# Patient Record
Sex: FEMALE | Race: WHITE | URBAN - METROPOLITAN AREA
[De-identification: names, ages, dates, MRNs, and addresses within clinical notes are randomized per-mention and may not be internally consistent; named-entity substitution may affect disease eponyms.]

---

## 2018-07-25 PROCEDURE — 88313 SPECIAL STAINS GROUP 2: CPT | Performed by: PATHOLOGY

## 2018-07-25 PROCEDURE — 88305 TISSUE EXAM BY PATHOLOGIST: CPT | Performed by: PATHOLOGY

## 2018-07-26 ENCOUNTER — LAB REQUISITION (OUTPATIENT)
Dept: LAB | Facility: HOSPITAL | Age: 78
End: 2018-07-26
Payer: MEDICARE

## 2018-07-26 DIAGNOSIS — D49.2 NEOPLASM OF UNSPECIFIED BEHAVIOR OF BONE, SOFT TISSUE, AND SKIN: ICD-10-CM

## 2018-08-03 PROCEDURE — 88305 TISSUE EXAM BY PATHOLOGIST: CPT | Performed by: PATHOLOGY

## 2018-08-06 ENCOUNTER — LAB REQUISITION (OUTPATIENT)
Dept: LAB | Facility: HOSPITAL | Age: 78
End: 2018-08-06
Payer: MEDICARE

## 2018-08-06 DIAGNOSIS — D49.2 NEOPLASM OF UNSPECIFIED BEHAVIOR OF BONE, SOFT TISSUE, AND SKIN: ICD-10-CM

## 2019-02-19 PROCEDURE — 88304 TISSUE EXAM BY PATHOLOGIST: CPT | Performed by: PATHOLOGY

## 2019-02-20 ENCOUNTER — LAB REQUISITION (OUTPATIENT)
Dept: LAB | Facility: HOSPITAL | Age: 79
End: 2019-02-20
Payer: MEDICARE

## 2019-02-20 DIAGNOSIS — D49.2 NEOPLASM OF UNSPECIFIED BEHAVIOR OF BONE, SOFT TISSUE, AND SKIN: ICD-10-CM

## 2019-08-16 PROCEDURE — 88305 TISSUE EXAM BY PATHOLOGIST: CPT | Performed by: PATHOLOGY

## 2019-08-17 ENCOUNTER — LAB REQUISITION (OUTPATIENT)
Dept: LAB | Facility: HOSPITAL | Age: 79
End: 2019-08-17
Payer: MEDICARE

## 2019-08-17 DIAGNOSIS — D49.2 NEOPLASM OF UNSPECIFIED BEHAVIOR OF BONE, SOFT TISSUE, AND SKIN: ICD-10-CM

## 2019-12-09 ENCOUNTER — EVALUATION (OUTPATIENT)
Dept: PHYSICAL THERAPY | Facility: REHABILITATION | Age: 79
End: 2019-12-09
Payer: MEDICARE

## 2019-12-09 ENCOUNTER — TRANSCRIBE ORDERS (OUTPATIENT)
Dept: PHYSICAL THERAPY | Facility: REHABILITATION | Age: 79
End: 2019-12-09

## 2019-12-09 DIAGNOSIS — I63.9 THALAMIC STROKE (HCC): Primary | ICD-10-CM

## 2019-12-09 DIAGNOSIS — I63.9 IMPENDING CEREBROVASCULAR ACCIDENT (HCC): Primary | ICD-10-CM

## 2019-12-09 PROCEDURE — 97162 PT EVAL MOD COMPLEX 30 MIN: CPT

## 2019-12-09 NOTE — PROGRESS NOTES
2019  Ladonna Duncan  : 1940  MRN: [id number]  Home:[phone] (home)   Mobile: There is no such number on file (mobile)  Insurance Information: Payor: MEDICARE / Plan: MEDICARE A AND B / Product Type: Medicare A & B Fee for Service /   Referring Provider: No ref  provider found    SUBJECTIVE:    HPI: Angel Montano is a [de-identified] y o  female referred to outpatient physical therapy for No diagnosis found  Physician Report: I had the pleasure of seeing Terrace Feeling at [facility] Cardiology office today for follow up of hospitalization for stroke    This is a pleasant [de-identified] y o  female with a history of venous insufficiency, hypertension, HLD, VARGHESE    Admitted 19 - 19 with right sided paresthesia  MRI + thalamic stroke  Loop recorder placed  Today, accompanied with   Has many questions regarding diet  PCP has already place referral to nutrition  Patient reports: Angel Montano reports she was recently diagnosed with a stroke 2019 staying in the hospital until 2019  Since her discharged she has noticed improvement in her right arm and leg but still notices abnormal sensation on her right side  Her biggest concern is the strength of her right hand and her handwriting since she is right hand  She denies any recent falls since her stroke but at this time she has noticed a decrease in her balance  Falls Hx: Her last fall was in  resulting in a fracture of her right hip  Home Environment: Two story home with a basement  She has 6 steps to enter her home with a handrail on her right side  When she is going up to the second floor she has 13 steps with a handrail on the right  She does have equipment in the bathroom such as grab bars for the bath, shower chair, and commode       DME: Michelle Shelton and [employer]     Patient Goal: " To improve my balance "     OBJECTIVE:   Blood Pressure Automatic Cuff: 156/79mmHg HR: 67bpm    Resting Nystagmus: Negative  Pronator Drift: Negative  Finger to Nose: Slight impairment on the right  Heel to [facility]: Normal  Rapid Alternating Movements: Slight impairment on the right     Manual Muscle Testing - Hip Right Left   Flexion 4 5   Abduction 4 5   Adduction 4 5     Manual Muscle Testing - Knee Right Left   Flexion 4 5   Extension 4 5     Manual Muscle Testing - Ankle Right Left   Doriflexion 4 5   Plantarflexion 4 5     Gait Mechanics: Corinne ambulates with a cane with a step through gait pattern  However, she demonstrates decrease stance time on the right versus the left and no heel strike on the right landing more on the midfoot to forefoot  She also demonstrates decrease arm swing on the right versus the left with limited trunk rotation  Outcome Measures 12/9/2019    2 Minute Walk Test (ft): 350 feet    10MWT (s):  13 49 seconds    5x Sit To Stand (s):  20 57 seconds    TUG (s) NT        ASSESSMENT  Patient presents to outpatient physical therapy with sx consistent with thalamic stroke affect the right-side of her body  Patient's impairments include decrease functional mobility, decrease functional endurance, decrease functional strength, impaired gait mechanics, and decrase balance  Gait mechanics and mobility measures reported in objective setting; indicate the patient is at an increase risk of experiencing a fall  The DGI or the FGA was not completed during this session because of time constraint of the session where the patient required frequent rest breaks  Jessi Lee will tolerate completing the FGA versus the DGI  Rishi Mares reported issues with her handwriting and will be recommending a script for occupational therapy to address these issues  Rishi Mares would benefit from outpatient physical therapy and with her having a good prognosis based on her past medical history and her current status  These impairments are currently limiting Corinne's ability to participate in recreational activities, community activities, and ADLs   Rishi Mares will benefit from skilled outpatient physical therapy to address the above impairments in order to improve patient's quality of life  STG's (8 weeks):   - Patient will be independent with home exercise program in 2 weeks  - Patient will demonstrate a  1m/sec increase in gait speed time to demonstrate improved functional mobility and functional independence  - Patient will complete 5 time sit to  no more than 15 seconds to demonstrate improved functional mobility, functional strength, and functional independence  - Patient will complete 6 minute walk test to demonstrate improved functional mobility, functional independence, and functional endurance  LTG's (16 weeks):  - Patient will demonstrate a  1m/sec increase in gait speed time to demonstrate improved functional mobility and functional independence  - Patient will complete 5 time sit to  no more than 12 seonds seconds to demonstrate improved functional mobility, functional strength, and functional independence  - Patient will demonstrate at least a 10% improvement on the 6 minute walk test to improved functional mobility, functional independence, and functional endurance  - Patient will score at least 22/30 on FGA to demonstrate improved dynamic balance, improved functional mobility, functional strength, and functioanl independence     PLAN OF CARE  Patient will benefit from skilled outpatient physical therapy 2 x/wk for 16 weeks  Therapeutic interventions to include therapeutic activity, therapeutic exercise, and neuromuscular re-education as listed below in the grids        Precautions: fall risk and thalamic stroke monitor blood pressure     Daily Treatment Diary      Exercise Diary         Endurance          Lower Extremity Strengthening         Static Balance         Dynamic Balance

## 2019-12-09 NOTE — LETTER
2019    Cameron Allison, [address] [facility] [address]    Patient: Rayne Rodriguez   YOB: 1940   Date of Visit: 2019     Encounter Diagnosis     ICD-10-CM    1  Thalamic stroke [facility]) [id number]        Dear Dr Filippo Price: Thank you for your recent referral of Rayne Rodriguez  Please review the attached evaluation summary from Ivory's recent visit  Please verify that you agree with the plan of care by signing the attached order  If you have any questions or concerns, please do not hesitate to call  I sincerely appreciate the opportunity to share in the care of one of your patients and hope to have another opportunity to work with you in the near future  Sincerely,    Min Hensley, PT      Referring Provider:      I certify that I have read the below Plan of Care and certify the need for these services furnished under this plan of treatment while under my care  Cameron Allison, [facility] [address]: [phone]          2019  Rayne Rodriguez  : 1940  MRN: [id number]  Home:[phone] (home)   Mobile: There is no such number on file (mobile)  Insurance Information: Payor: MEDICARE / Plan: MEDICARE A AND B / Product Type: Medicare A & B Fee for Service /   Referring Provider: No ref  provider found    SUBJECTIVE:    HPI: Narendra Thomas is a [de-identified] y o  female referred to outpatient physical therapy for No diagnosis found  Physician Report: I had the pleasure of seeing Colleen Coleman at [address] [facility] Cardiology office today for follow up of hospitalization for stroke    This is a pleasant [de-identified] y o  female with a history of venous insufficiency, hypertension, HLD, VARGHESE    Admitted 19 - 19 with right sided paresthesia  MRI + thalamic stroke  Loop recorder placed  Today, accompanied with   Has many questions regarding diet  PCP has already place referral to nutrition       Patient reports: Narendra Thomas reports she was recently diagnosed with a stroke 11/11//2019 staying in the hospital until 11/14/2019  Since her discharged she has noticed improvement in her right arm and leg but still notices abnormal sensation on her right side  Her biggest concern is the strength of her right hand and her handwriting since she is right hand  She denies any recent falls since her stroke but at this time she has noticed a decrease in her balance  Falls Hx: Her last fall was in 2015 resulting in a fracture of her right hip  Home Environment: Two story home with a basement  She has 6 steps to enter her home with a handrail on her right side  When she is going up to the second floor she has 13 steps with a handrail on the right  She does have equipment in the bathroom such as grab bars for the bath, shower chair, and commode  DME: Anastacio Parks and [employer]     Patient Goal: " To improve my balance "     OBJECTIVE:   Blood Pressure Automatic Cuff: 156/79mmHg HR: 67bpm    Resting Nystagmus: Negative  Pronator Drift: Negative  Finger to Nose: Slight impairment on the right  Heel to Shine: Normal  Rapid Alternating Movements: Slight impairment on the right     Manual Muscle Testing - Hip Right Left   Flexion 4 5   Abduction 4 5   Adduction 4 5     Manual Muscle Testing - Knee Right Left   Flexion 4 5   Extension 4 5     Manual Muscle Testing - Ankle Right Left   Doriflexion 4 5   Plantarflexion 4 5     Gait Mechanics: Ivory ambulates with a cane with a step through gait pattern  However, she demonstrates decrease stance time on the right versus the left and no heel strike on the right landing more on the midfoot to forefoot  She also demonstrates decrease arm swing on the right versus the left with limited trunk rotation       Outcome Measures 12/9/2019    2 Minute Walk Test (ft): 350 feet    10MWT (s):   13 49 seconds    5x Sit To Stand (s):   20 57 seconds    TUG (s) NT        ASSESSMENT  Patient presents to outpatient physical therapy with sx consistent with  thalamic stroke affect the right-side of her body  Patient's impairments include decrease functional mobility, decrease functional endurance, decrease functional strength, impaired gait mechanics, and decrase balance  Gait mechanics and mobility measures reported in objective setting; indicate the patient is at an increase risk of experiencing a fall  The DGI or the FGA was not completed during this session because of time constraint of the session where the patient required frequent rest breaks  Perla Rangel will tolerate completing the FGA versus the DGI  Charlie Lopes reported issues with her handwriting and will be recommending a script for occupational therapy to address these issues  Charlie Lopes would benefit from outpatient physical therapy and with her having a good prognosis based on her past medical history and her current status  These impairments are currently limiting Ivory's ability to participate in recreational activities, community activities, and ADLs  Charlie Lopes will benefit from skilled outpatient physical therapy to address the above impairments in order to improve patient's quality of life  STG's (8 weeks):   - Patient will be independent with home exercise program in 2 weeks  - Patient will demonstrate a  1m/sec increase in gait speed time to demonstrate improved functional mobility and functional independence  - Patient will complete 5 time sit to  no more than 15 seconds to demonstrate improved functional mobility, functional strength, and functional independence  - Patient will complete 6 minute walk test to demonstrate improved functional mobility, functional independence, and functional endurance  LTG's (16 weeks):  - Patient will demonstrate a  1m/sec increase in gait speed time to demonstrate improved functional mobility and functional independence    - Patient will complete 5 time sit to  no more than 12 seonds seconds to demonstrate improved functional mobility, functional strength, and functional independence  - Patient will demonstrate at least a 10% improvement on the 6 minute walk test to improved functional mobility, functional independence, and functional endurance  - Patient will score at least 22/30 on FGA to demonstrate improved dynamic balance, improved functional mobility, functional strength, and functioanl independence     PLAN OF CARE  Patient will benefit from skilled outpatient physical therapy  2 x/wk for  16 weeks  Therapeutic interventions to include therapeutic activity, therapeutic exercise, and neuromuscular re-education as listed below in the grids        Precautions: fall risk and thalamic stroke monitor blood pressure     Daily Treatment Diary      Exercise Diary         Endurance          Lower Extremity Strengthening         Static Balance         Dynamic Balance

## 2019-12-12 ENCOUNTER — EVALUATION (OUTPATIENT)
Dept: OCCUPATIONAL THERAPY | Facility: REHABILITATION | Age: 79
End: 2019-12-12
Payer: MEDICARE

## 2019-12-12 ENCOUNTER — OFFICE VISIT (OUTPATIENT)
Dept: PHYSICAL THERAPY | Facility: REHABILITATION | Age: 79
End: 2019-12-12
Payer: MEDICARE

## 2019-12-12 DIAGNOSIS — I63.9 THALAMIC STROKE (HCC): Primary | ICD-10-CM

## 2019-12-12 PROCEDURE — 97112 NEUROMUSCULAR REEDUCATION: CPT

## 2019-12-12 PROCEDURE — 97116 GAIT TRAINING THERAPY: CPT

## 2019-12-12 PROCEDURE — 97110 THERAPEUTIC EXERCISES: CPT

## 2019-12-12 PROCEDURE — 97166 OT EVAL MOD COMPLEX 45 MIN: CPT | Performed by: OCCUPATIONAL THERAPIST

## 2019-12-12 NOTE — PROGRESS NOTES
OCCUPATIONAL THERAPY INITIAL EVALUATION:    2019  [facility]  1940  [id number]  MEKA Harvey  No diagnosis found  Subjective Evaluation    Quality of life: good          "I lost a lot of strength in my R hand "    PATIENT GOAL: "I want to get back to where I was before my stroke "    HISTORY OF PRESENT ILLNESS:     Pt is a [de-identified] y o  female who was referred to Occupational Therapy s/p Thalamic Stroke  Pt presented to [facility] on 2019 with complaint of right sided paresthesia  The symptoms waxed and waned one day prior to ED evaluation  Reports missing several doses of her BP meds at home  MRI with evidence of thalamic stroke  She was followed by Neurology during her stay and received 3 days of aspirin and was started on clopidogrel  Atorvastatin was also started  A 2D echo with enlarged left atrium, and Cardiology consulted for placement of loop recorder  She underwent loop recorder insertion on discharge day and is feeling well  She is going to follow up with PT/OT as an outpatient  She will also follow up with Cardiology and Neurology as an outpatient  PT referred Pt for OT services 2* complaints of R side weakness and changes in handwriting  Pt reports continuing to notice R sided weakness, altered sensation throughout RUE, stiffness with IR AROM, and decreased fluidity/legibility of handwriting  Pt lives in a two story home independently  Pt currently performing all ADLs at independent status, though demo with Min A with IADLs  Pt is a retired  at multiple locations  Pt loss role of  at this time since CVA and also since no car longer owned  PMH: No past medical history on file  Pain Levels:     Restin    With Activity:  3    Objective     Functional Assessment        Comments  See impairment section for further details  Impairment Observations:  1  R hand weakness  2  Decreased fluidity/legibility of handwriting  3   Impaired R proprioception  4  Altered sensation throughout RUE        Dynamometer Position #2:   R: 46  L: 50    R hand dominant  Action:  3 point pinch: R 11 5 and L 11  2 point pinch: R 6 and L 7  Lateral pinch: R 11 and L 11    9-hole Peg Test: RUE: 18 94 seconds, LUE: 20 96 seconds    Darrius Hand function Test:  Complete next session 2* time constraints  Myofilaments: B/L 3 61, no difficulties with distinguishing between hot or cold temperatures    B/L AROM:  Shoulder elevation: B/L full  Shoulder FF: B/L full  Shoulder ABD: B/L full  ER/IR: B/L full  Elbow ext/flex: B/L full  Sup/Pron: B/L full  Wrist flex/ext: B/L full  Composite: B/L full  Hook: B/L full  Opposition: B/L intact  Finger to nose: impaired R proprioception, L intact  Dysdiadochokinesia: B/L intact      Assessment  Assessment details: See skilled analysis for further details  Skilled Analysis:  Pt is a [de-identified] y o  female referred to Occupational Therapy s/p Thalamic Stroke  Pt presents with R hand weakness, decreased fluidity/legibility of handwriting, impaired R proprioception, and altered sensation throughout RUE affecting functional use and engagement in meaningful occupations/IADLs  Pt will benefit from skilled Occupational Therapy services 2x/week for 6-8 weeks with focus on self-care management, neuro re-ed, manual tx, and sensory re-ed to improve on the above deficits, improve functional use of RUE, maximize highest level of independence, and enhance overall QOL       Short Term Goals:  - Pt will increase R prehension patterns for improved tripod with utensil management and dynamic tripod grasp with writing utensil with <5% droppage and 70% improved fluidity/legibility 4 weeks  - Pt will increase proprioception of RUE hand to target x 50% for improved functional reach vision occluded with ADL/IADL tasks 4 weeks  - Pt will demo with G carryover of Home Exercise Program to improve functional progression towards goals in Plan of care and for improved functional use of RUE 4 weeks  - Pt will increase automaticity of R UE to 75% for improved grasp release of tabletop items for improved functional performance with ADLs/IADLs/salient tasks 4 weeks  - Pt will increase rate of manipulation for all R FM tests x 70% for improved functional performance with salient tasks 4 weeks  - Pt will increase R UE to Mod I with <10% cuing for tabletop tasks for improved functional performance of life roles and salient tasks 4 weeks      Long Term Goals:  - Pt will increase automaticity of  R UE to 95% for resumption of B integrative tasks  - Pt will increase R prehension patterns for improved tripod with utensil management and dynamic tripod grasp with writing utensil with <0% droppage and 90% improved fluidity/legibility   - Pt will increase proprioception of RUE hand to target x 85% for improved functional reach vision occluded with ADL/IADL tasks   - Pt will increase automaticity of R UE to 90% for improved grasp release of tabletop items for improved functional performance with ADLs/IADLs/salient tasks  - Pt will increase rate of manipulation for all R FM tests x 90% for improved functional performance with salient tasks   - Pt will increase R UE to independent status with 0% cuing for tabletop tasks for improved functional performance of life roles and salient tasks     OTHER PLANNED THERAPY INTERVENTIONS:   Supine, seated, and in stance neuro re-ed  FMC/prehension  Timed Trials  Manual tx  Sensory re-ed  Seated functional reach: crossing midline  Supine place and hold  WBearing strategies    Handwriting  Hand to target    INTERVENTION COMMENTS:  Diagnosis: Thalamic Stroke   Precautions: HTN, Hypothyroidism, fall risk  FOTO: 40, with 40% limitation in Hand function   Insurance: Payor: MEDICARE / Plan: MEDICARE A AND B / Product Type: Medicare A & B Fee for Service /   1 of 10 visits, PN due 01/12/2020

## 2019-12-12 NOTE — LETTER
December 16, 2019    Damaris Calvillo, [address] [facility] [address]    Patient: Evelyn Watkins   YOB: 1940   Date of Visit: 12/12/2019     Encounter Diagnosis     ICD-10-CM    1  Thalamic stroke [facility]) I63 9        Dear Dr Radha Love: Thank you for your recent referral of Evelyn Watkins  Please review the attached evaluation summary from Ivory's recent visit  Please verify that you agree with the plan of care by signing the attached order  If you have any questions or concerns, please do not hesitate to call  I sincerely appreciate the opportunity to share in the care of one of your patients and hope to have another opportunity to work with you in the near future  Sincerely,    Ludwig Kimbrough, OT      Referring Provider:     I certify that I have read the below Plan of Care and certify the need for these services furnished under this plan of treatment while under my care  Damaris Rajinder, [address] [address]  VIA Facsimile: [phone]          OCCUPATIONAL THERAPY INITIAL EVALUATION:    12/12/2019  Evelyn Watkins  1940  [id number]  MEKA Huizar  No diagnosis found  Subjective Evaluation    Quality of life: good          "I lost a lot of strength in my R hand "    PATIENT GOAL: "I want to get back to where I was before my stroke "    HISTORY OF PRESENT ILLNESS:     Pt is a [de-identified] y o  female who was referred to Occupational Therapy s/p Thalamic Stroke  Pt presented to [facility] on 11/11/2019 with complaint of right sided paresthesia  The symptoms waxed and waned one day prior to ED evaluation  Reports missing several doses of her BP meds at home  MRI with evidence of thalamic stroke  She was followed by Neurology during her stay and received 3 days of aspirin and was started on clopidogrel  Atorvastatin was also started  A 2D echo with enlarged left atrium, and Cardiology consulted for placement of loop recorder   She underwent loop recorder insertion on discharge day and is feeling well  She is going to follow up with PT/OT as an outpatient  She will also follow up with Cardiology and Neurology as an outpatient  PT referred Pt for OT services 2* complaints of R side weakness and changes in handwriting  Pt reports continuing to notice R sided weakness, altered sensation throughout RUE, stiffness with IR AROM, and decreased fluidity/legibility of handwriting  Pt lives in a two story home independently  Pt currently performing all ADLs at independent status, though demo with Min A with IADLs  Pt is a retired  at multiple locations  Pt loss role of  at this time since CVA and also since no car longer owned  PMH: No past medical history on file  Pain Levels:     Restin    With Activity:  3    Objective     Functional Assessment        Comments  See impairment section for further details  Impairment Observations:  1  R hand weakness  2  Decreased fluidity/legibility of handwriting  3  Impaired R proprioception  4  Altered sensation throughout RUE        Dynamometer Position #2:   R: 46  L: 50    R hand dominant  Action:  3 point pinch: R 11 5 and L 11  2 point pinch: R 6 and L 7  Lateral pinch: R 11 and L 11    9-hole Peg Test: RUE: 18 94 seconds, LUE: 20 96 seconds    Darrius Hand function Test:  Complete next session 2* time constraints  Myofilaments: B/L 3 61, no difficulties with distinguishing between hot or cold temperatures    B/L AROM:  Shoulder elevation: B/L full  Shoulder FF: B/L full  Shoulder ABD: B/L full  ER/IR: B/L full  Elbow ext/flex: B/L full  Sup/Pron: B/L full  Wrist flex/ext: B/L full  Composite: B/L full  Hook: B/L full  Opposition: B/L intact  Finger to nose: impaired R proprioception, L intact  Dysdiadochokinesia: B/L intact      Assessment  Assessment details: See skilled analysis for further details  Skilled Analysis:  Pt is a [de-identified] y o  female referred to Occupational Therapy s/p Thalamic Stroke  Pt presents with R hand weakness, decreased fluidity/legibility of handwriting, impaired R proprioception, and altered sensation throughout RUE affecting functional use and engagement in meaningful occupations/IADLs  Pt will benefit from skilled Occupational Therapy services 2x/week for 6-8 weeks with focus on self-care management, neuro re-ed, manual tx, and sensory re-ed to improve on the above deficits, improve functional use of RUE, maximize highest level of independence, and enhance overall QOL       Short Term Goals:  - Pt will increase R prehension patterns for improved tripod with utensil management and dynamic tripod grasp with writing utensil with <5% droppage and 70% improved fluidity/legibility 4 weeks  - Pt will increase proprioception of RUE hand to target x 50% for improved functional reach vision occluded with ADL/IADL tasks 4 weeks  - Pt will demo with G carryover of Home Exercise Program to improve functional progression towards goals in Plan of care and for improved functional use of RUE 4 weeks  - Pt will increase automaticity of R UE to 75% for improved grasp release of tabletop items for improved functional performance with ADLs/IADLs/salient tasks 4 weeks  - Pt will increase rate of manipulation for all R FM tests x 70% for improved functional performance with salient tasks 4 weeks  - Pt will increase R UE to Mod I with <10% cuing for tabletop tasks for improved functional performance of life roles and salient tasks 4 weeks      Long Term Goals:  - Pt will increase automaticity of  R UE to 95% for resumption of B integrative tasks  - Pt will increase R prehension patterns for improved tripod with utensil management and dynamic tripod grasp with writing utensil with <0% droppage and 90% improved fluidity/legibility   - Pt will increase proprioception of RUE hand to target x 85% for improved functional reach vision occluded with ADL/IADL tasks   - Pt will increase automaticity of R UE to 90% for improved grasp release of tabletop items for improved functional performance with ADLs/IADLs/salient tasks  - Pt will increase rate of manipulation for all R FM tests x 90% for improved functional performance with salient tasks   - Pt will increase R UE to independent status with 0% cuing for tabletop tasks for improved functional performance of life roles and salient tasks     OTHER PLANNED THERAPY INTERVENTIONS:   Supine, seated, and in stance neuro re-ed  FMC/prehension  Timed Trials  Manual tx  Sensory re-ed  Seated functional reach: crossing midline  Supine place and hold  WBearing strategies    Handwriting  Hand to target    INTERVENTION COMMENTS:  Diagnosis: Thalamic Stroke   Precautions: HTN, Hypothyroidism, fall risk  FOTO: 40, with 40% limitation in Hand function   Insurance: Payor: MEDICARE / Plan: MEDICARE A AND B / Product Type: Medicare A & B Fee for Service /   1 of 10 visits, PN due 01/12/2020

## 2019-12-12 NOTE — PROGRESS NOTES
Daily Note     Today's date: 2019  Patient name: Nam Burroughs  : 1940  MRN: [id number]  Referring provider: MEKA Irizarry  Dx:   Encounter Diagnosis     ICD-10-CM    1  Thalamic stroke ([facility] [address] ) I63 9        Start Time:   Stop Time: 1545  Total time in clinic (min): 40 minutes    Subjective: Pt reports she feels good today but she expresses that she would like to get rid of her altered sensation on her rt side, denies recent hx of falls  Objective: See treatment diary below    Exercise Diary         Endurance  Scifit lv 1 x3 min, rest then  x2 min        Lower Extremity Strengthening  HEP x8 minutes  STS  Heel raise  1/4 squat  bridges       Static Balance         Dynamic Balance  Lateral stepping 20ft x4  Crossover stepping 20ft x4  Grapevines 20ft x4  Tandem walking 20ft x4    Ambulation w/out AD x 8 minutes    Gait training w/ cane x12 minutes                       Assessment: Tolerated treatment well  She requires rail support for grapevines, and tandem walking  Observed forward flexed posture w/ amulation in clinic, but upright posture w/ balance exercises  Pt reported during session she was not taught how to walk w/ cane, adjusted cane to pt height and reviewed gait training w/ AD in left hand, as she had been using it in her right  During session she reported fatigue w/ cane in her left hand, educated pt to practice at home w/ cane in left hand at short intervals, about 2-3, and progress to tolerance  Provided pt w/ LE strengthening HEP, pt demonstrated correct form in clinic  Significant fatigue using Scifit, pt required rest during and after its use  Patient would benefit from continued PT      Plan: Continue per plan of care  Continue gait training w/ AD as needed, progress balance exercises, trial use of recumbent bike in place of scifit for increased tolerance to endurance exercises

## 2019-12-16 ENCOUNTER — OFFICE VISIT (OUTPATIENT)
Dept: PHYSICAL THERAPY | Facility: REHABILITATION | Age: 79
End: 2019-12-16
Payer: MEDICARE

## 2019-12-16 ENCOUNTER — TRANSCRIBE ORDERS (OUTPATIENT)
Dept: PHYSICAL THERAPY | Facility: REHABILITATION | Age: 79
End: 2019-12-16

## 2019-12-16 DIAGNOSIS — I63.9 THALAMIC STROKE (HCC): ICD-10-CM

## 2019-12-16 DIAGNOSIS — I63.9 IMPENDING CEREBROVASCULAR ACCIDENT (HCC): Primary | ICD-10-CM

## 2019-12-16 DIAGNOSIS — I63.9 THALAMIC STROKE (HCC): Primary | ICD-10-CM

## 2019-12-16 PROCEDURE — 97112 NEUROMUSCULAR REEDUCATION: CPT

## 2019-12-16 PROCEDURE — 97110 THERAPEUTIC EXERCISES: CPT

## 2019-12-16 NOTE — PROGRESS NOTES
Daily Note     Today's date: 2019  Patient name: Roni Montilla  : 1940  MRN: [id number]  Referring provider: MEKA Paris  Dx:   Encounter Diagnosis     ICD-10-CM    1  Thalamic stroke ([facility] [address] ) I63 9        Start Time: 1130  Stop Time: 1215  Total time in clinic (min): 45 minutes    Subjective: Pt reports she completed HEP prior to coming to PT  Objective: See treatment diary below    Exercise Diary       Endurance  Scifit lv 1 x3 min, rest then  x2 min  Attempted recumbent bike    Scifit lv 1 x3 min, rest, x2      Lower Extremity Strengthening  HEP x8 minutes  STS  Heel raise  1/4 squat  bridges  STS x7, no UEs  Heel raise 10x2  1/4 squats 10x2  Bridges 10x2     Static Balance         Dynamic Balance  Lateral stepping 20ft x4  Crossover stepping 20ft x4  Grapevines 20ft x4  Tandem walking 20ft x4    Ambulation w/out AD x 8 minutes    Gait training w/ cane x12 minutes  Ambulation w/out AD 2 min x3 w/ 2 minute rest (LBP during activity 3/10)  Lateral stepping 20 ft x4  Backwards walking 20ft x4  Ambulation w/ sudden stops x2 minutes                     Assessment: Tolerated treatment well  Educated pt to avoid exercises shortly before session in order to manage fatigue  Observed fatigue w/ all strengthening exericses, pt requires seated rest break between most exercises  Pt reports discomfort throughout use of recumbent bike, despite repeated efforts to adjust it, deferred further use of recumbent bike because of this  Antalgic gait noted during ambulation, but no LOB observed  However her duration is limited by onset of LBP, seated rest if effective in reducing pt pain  With backwards walking pt has low guard and widened base of support, recommend anterior/posterior weight shifting to assist pt in increased comfort w/ backwards walking  Patient would benefit from continued PT      Plan: Continue per plan of care    Continue gait training w/out AD as she does not use one at home, incorporate biodex next visit for improved ankle and hip strategy use  Trial steps, monitor pt balance and fatigue on clinic steps

## 2019-12-19 ENCOUNTER — OFFICE VISIT (OUTPATIENT)
Dept: OCCUPATIONAL THERAPY | Facility: REHABILITATION | Age: 79
End: 2019-12-19
Payer: MEDICARE

## 2019-12-19 ENCOUNTER — OFFICE VISIT (OUTPATIENT)
Dept: PHYSICAL THERAPY | Facility: REHABILITATION | Age: 79
End: 2019-12-19
Payer: MEDICARE

## 2019-12-19 DIAGNOSIS — I63.9 THALAMIC STROKE (HCC): Primary | ICD-10-CM

## 2019-12-19 PROCEDURE — 97110 THERAPEUTIC EXERCISES: CPT

## 2019-12-19 PROCEDURE — 97112 NEUROMUSCULAR REEDUCATION: CPT

## 2019-12-19 PROCEDURE — 97112 NEUROMUSCULAR REEDUCATION: CPT | Performed by: OCCUPATIONAL THERAPIST

## 2019-12-19 NOTE — PROGRESS NOTES
Daily Note     Today's date: 2019  Patient name: Coral Jimenez  : 1940  MRN: [id number]  Referring provider: MEKA Neri  Dx: No diagnosis found  Subjective: "This really works your hands"  Objective: See treatment description below    UBE x 2 minutes prograde and x 2 minutes retrograde in seated position bilaterally with x 1 2 resistance level focusing on improved proximal strength, B/L coordination, and gross grasp  OTR educated Pt on HEP-- theraputty created for Pt focusing on improved /pinch/intrinsic strength of R hand  OTR educated Pt on the importance of performing 3-4x/week with 5 reps performed for each exercise  OTR provided Pt with handout with written/visual cues and physical demonstration to improved carryover and understanding  Seated functional reach crossing midline for dowel retrieval and placement in dowel board utilizing calibrated hand gripper #50, followed by retrieval of smaller dowels and placement in larger dowels utilizing tongs focusing on  strength, hand to target, and prehension patterns  Assessment: Tolerated treatment well  Patient would benefit from continued OT     Pt with Max fatigue reported after UBE demands with x 2 minutes rest break required  Pt demo with G understanding and carryover of HEP with benefits of physical demonstration evident  Pt demo with x 10% droppage with gross grasp demands on calibrated hand gripper and 0% droppage when utilizing tongs with fatigue reported in both hand and shoulder  Plan: Continue per plan of care        INTERVENTION COMMENTS:  Diagnosis: Thalamic Stroke   Precautions: HTN, Hypothyroidism, fall risk  FOTO: 40, with 40% limitation in Hand function   Insurance: Payor: Kiesha Curry / Plan: MEDICARE A AND B / Product Type: Medicare A & B Fee for Service /   2 of 10 visits, PN due 2020

## 2019-12-19 NOTE — PROGRESS NOTES
Daily Note     Today's date: 2019  Patient name: Rayne Rodriguez  : 1940  MRN: [id number]  Referring provider: MEKA Landis  Dx:   Encounter Diagnosis     ICD-10-CM    1  Thalamic stroke ([facility] [address] ) I63 9        Start Time: 1550  Stop Time: 1630  Total time in clinic (min): 40 minutes    Subjective: Pt reports she was not compliant w/ HEP this week  Objective: See treatment diary below    Exercise Diary     Endurance  Scifit lv 1 x3 min, rest then  x2 min  Attempted recumbent bike    Scifit lv 1 x3 min, rest, x2  Scifit lv 1 x3 min, rest, x2    Lower Extremity Strengthening  HEP x8 minutes  STS  Heel raise  1/4 squat  bridges  STS x7, no UEs  Heel raise 10x2  1/4 squats 10x2  Bridges 10x2     Static Balance      Biodex:  Lateral sway x2 min 95%  AP sway x1 min 97%  LOS x2 min 49%  Maze 21%    Unlocked lv 12  Lateral sway x2 min 97% (hand rail use w/ LOB)  AP sway x2 min 88% (hand rail use w/ posterior LOB)  LOS x2 min 36%, 2 hand rails   Dynamic Balance  Lateral stepping 20ft x4  Crossover stepping 20ft x4  Grapevines 20ft x4  Tandem walking 20ft x4    Ambulation w/out AD x 8 minutes    Gait training w/ cane x12 minutes  t (LBP during activity 3/10)  Lateral stepping 20 ft x4  Backwards walking 20ft x4  Ambulation w/ sudden stops x2 minutes  Ambulation w/out AD 3 min x2 w/ 2 minute rest                   Assessment: Tolerated treatment well  Pt demonstrates consistently appropriate balance strategies on biodex, however she requires use of handrail for LOB when platform is unlocked  Increased flexed posture noted as patient fatigues, verbal cues effective in correcting posture, and during second lap pt was able to self correct, demonstrating carryover  Fatigue does limit pt participation and she will required continued endurance training to improve tolerance to activity  Patient would benefit from continued PT      Plan: Continue per plan of care    Continue gait training w/out AD as she does not use one at home, progress difficulty of biodex  Add foam balance challenge in static and trial steps next visit

## 2019-12-24 ENCOUNTER — OFFICE VISIT (OUTPATIENT)
Dept: OCCUPATIONAL THERAPY | Facility: REHABILITATION | Age: 79
End: 2019-12-24
Payer: MEDICARE

## 2019-12-24 ENCOUNTER — OFFICE VISIT (OUTPATIENT)
Dept: PHYSICAL THERAPY | Facility: REHABILITATION | Age: 79
End: 2019-12-24
Payer: MEDICARE

## 2019-12-24 DIAGNOSIS — I63.9 THALAMIC STROKE (HCC): Primary | ICD-10-CM

## 2019-12-24 PROCEDURE — 97110 THERAPEUTIC EXERCISES: CPT

## 2019-12-24 PROCEDURE — 97112 NEUROMUSCULAR REEDUCATION: CPT | Performed by: OCCUPATIONAL THERAPIST

## 2019-12-24 PROCEDURE — 97112 NEUROMUSCULAR REEDUCATION: CPT

## 2019-12-24 NOTE — PROGRESS NOTES
Daily Note     Today's date: 2019  Patient name: Evelyn Watkins  : 1940  MRN: [id number]  Referring provider: MEKA Huizar  Dx:   Encounter Diagnosis     ICD-10-CM    1  Thalamic stroke ([facility] [address] ) I63 9        Start Time: 1000  Stop Time: 1040  Total time in clinic (min): 40 minutes    Subjective: Pt reports she is tired, but she reports this is usual  She reports this has been a busy week because of the holiday and was unable to do her HEP  Objective: See treatment diary below    Exercise Diary     Endurance  Scifit lv 1 x3 min, rest then  x2 min  Attempted recumbent bike    Scifit lv 1 x3 min, rest, x2  Scifit lv 1 x3 min, rest, x2 Scifit lv 2 x3 min, rest, x2    Lower Extremity Strengthening  HEP x8 minutes  STS  Heel raise  1/4 squat  bridges  STS x7, no UEs  Heel raise 10x2  1/4 squats 10x2  Bridges 10x2      Static Balance      Biodex:  Lateral sway x2 min 95%  AP sway x1 min 97%  LOS x2 min 49%  Maze 21%    Unlocked lv 12  Lateral sway x2 min 97% (hand rail use w/ LOB)  AP sway x2 min 88% (hand rail use w/ posterior LOB)  LOS x2 min 36%, 2 hand rails Foam:  FTEO x1 min  FTEC x2 min (3 posterior LOB)  Semitandem EO, 2 fingers on chair x1 min, 1 lateral LOB  Semitandem EC x1 min each, 4 fingers on chair  Single leg stance on foam,2 fingers on chair x1 min each   Dynamic Balance  Lateral stepping 20ft x4  Crossover stepping 20ft x4  Grapevines 20ft x4  Tandem walking 20ft x4    Ambulation w/out AD x 8 minutes    Gait training w/ cane x12 minutes  t (LBP during activity 3/10)  Lateral stepping 20 ft x4  Backwards walking 20ft x4  Ambulation w/ sudden stops x2 minutes  Ambulation w/out AD 3 min x2 w/ 2 minute rest Tandem stepping on airex foam x2 minutes, continuous LOB, requires rail  Lateral stepping on airex foam x2 minutes  Ascend/desc clinic stairwell x2                    Assessment: Tolerated treatment well   Pt appropriately challenged w/ foam balance exercises, errors and need for external support noted above  No LOB observed when completing steps, however pt required minimum verbal cueing for reminder to fully place heel onto step when ascending  Pt is fatigued after stair use and Scifit and requires rest break  Patient would benefit from continued PT      Plan: Continue per plan of care  Continue foam balance challenges w/ pt and add hip abductor exercises for improved use of hip strategy  Review static balance exercises and provide as addition to HEP next visit

## 2019-12-24 NOTE — PROGRESS NOTES
Daily Note     Today's date: 2019  Patient name: Ramirez Sandoval  : 1940  MRN: [id number]  Referring provider: MEKA Macias  Dx: No diagnosis found  Subjective: "      Objective: See treatment description below    UBE x 4 minutes prograde and x 4 minutes retrograde in seated position bilaterally with x 1 5 resistance level focusing on proximal strength/stability, B/L coordination, and muscle endurance  Pt engaged in weaving loom with focus on simulated knitting task with focus on FMC/prehension and B/L coordination  Assessment: Tolerated treatment well  Patient would benefit from continued OT     Pt tolerated UBE for increased amount of time on this date with Max fatigue reported in BUE with x 1 minute rest break required  Pt able to complete weaving loom at appropriate pace with excellent R FMC/prehension and B/L coordination abilities-- Pt with no difficulties evident  Pt with self-report of minimal stiffness in R hand while performing weaving loom task  Plan: Continue per plan of care        INTERVENTION COMMENTS:  Diagnosis: Thalamic Stroke   Precautions: HTN, Hypothyroidism, fall risk  FOTO: 40, with 40% limitation in Hand function   Insurance: Payor: Linda Tinajero / Plan: MEDICARE A AND B / Product Type: Medicare A & B Fee for Service /   2 of 10 visits, PN due 2020

## 2019-12-26 ENCOUNTER — OFFICE VISIT (OUTPATIENT)
Dept: OCCUPATIONAL THERAPY | Facility: REHABILITATION | Age: 79
End: 2019-12-26
Payer: MEDICARE

## 2019-12-26 ENCOUNTER — OFFICE VISIT (OUTPATIENT)
Dept: PHYSICAL THERAPY | Facility: REHABILITATION | Age: 79
End: 2019-12-26
Payer: MEDICARE

## 2019-12-26 DIAGNOSIS — I63.9 THALAMIC STROKE (HCC): Primary | ICD-10-CM

## 2019-12-26 PROCEDURE — 97110 THERAPEUTIC EXERCISES: CPT

## 2019-12-26 PROCEDURE — 97112 NEUROMUSCULAR REEDUCATION: CPT

## 2019-12-26 PROCEDURE — 97530 THERAPEUTIC ACTIVITIES: CPT

## 2019-12-26 NOTE — PROGRESS NOTES
Daily Note     Today's date: 2019  Patient name: Abel Evans  : 1940  MRN: [id number]  Referring provider: MEKA De La Cruz  Dx:   Encounter Diagnosis     ICD-10-CM    1  Thalamic stroke ([facility] [address] ) I63 9        Start Time:   Stop Time: 1455  Total time in clinic (min): 40 minutes    Subjective:  Pt reported her head felt fuzzy after last visit of PT, but it cleared yesterday  She initially reported this was due to balance exercises, later in session she reports it was due to Scifit         Objective: See treatment diary below    Exercise Diary     Endurance  Attempted recumbent bike    Scifit lv 1 x3 min, rest, x2  Scifit lv 1 x3 min, rest, x2 Scifit lv 2 x3 min, rest, x2     Lower Extremity Strengthening  STS x7, no UEs  Heel raise 10x2  1/4 squats 10x2  Bridges 10x2       Static Balance    Biodex:  Lateral sway x2 min 95%  AP sway x1 min 97%  LOS x2 min 49%  Maze 21%    Unlocked lv 12  Lateral sway x2 min 97% (hand rail use w/ LOB)  AP sway x2 min 88% (hand rail use w/ posterior LOB)  LOS x2 min 36%, 2 hand rails Foam:  FTEO x1 min  FTEC x2 min (3 posterior LOB)  Semitandem EO, 2 fingers on chair x1 min, 1 lateral LOB  Semitandem EC x1 min each, 4 fingers on chair  Single leg stance on foam,2 fingers on chair x1 min each Foam:  FTEO x2 min  FTEC x2 min (2 posterior LOB)  1 minute rest break  Semitandem EO,x2 min  Semitandem EC x1 min each  Foam ball toss x2 minutes, 1 posterior LOB  Review of static HEP:  FTEO/EC, semitandem EO/EC, HTs/HNs         Dynamic Balance  t (LBP during activity 3/10)  Lateral stepping 20 ft x4  Backwards walking 20ft x4  Ambulation w/ sudden stops x2 minutes  Ambulation w/out AD 3 min x2 w/ 2 minute rest Tandem stepping on airex foam x2 minutes, continuous LOB, requires rail  Lateral stepping on airex foam x2 minutes  Ascend/desc clinic stairwell x2 Tandem stepping on airex foam 2 min x2, requires rail use  Walking w/ HTs/HNs x2 minutes each Assessment: Tolerated treatment well  Provided additional rest breaks this visit due to pt subjective report  Pt experiences frequent LOB on foam w/ EC, but this visit was able to complete w/out use of chair, allowing for better focus on use of vestibular system for balance  Provided pt w/ static balance exercises this visit, pt demonstrated correct and safe form  During walking she reported her Rt leg was tired  Recommend continued monitoring of LE fatigue, working w/in tolerable range and progressing as able  Patient would benefit from continued PT      Plan: Continue per plan of care  Continue foam balance challenges w/ pt and add hip abductor exercises for improved use of hip strategy  Provide additional rest breaks while maximizing time standing that pt tolerates

## 2019-12-26 NOTE — PROGRESS NOTES
Daily Note     Today's date: 2019  Patient name: José Miguel Wilder  : 1940  MRN: [id number]  Referring provider: MEKA Govea  Dx:   Encounter Diagnosis   Name Primary?  Thalamic stroke ([facility] [address] ) Yes       Start Time: 1315  Stop Time: 1192  Total time in clinic (min): 60 minutes    Subjective: "My hand gets stiff after I work with it"  Objective: See treatment diary below    Pt participated in skilled OT focusing on [address], [place], strengthening and endurance  Pt engaged in [address] for 3 min fwd/3min backwards on L1 2 focsuing on strengthening and endurance  Pt engaged in line tangles focusing on tripod grasp w/digit isolation followed by IASTM to right hand due to report of "stiffness"  Pt completed peg pattern using large pegs connecting pegs w/string focusing on fmc and BL integration  Ball bounce focusing on automaticity of grasp/release and eye/hand coordination  Assessment: Tolerated treatment well  Patient would benefit from continued OT  Pt completed line tangles demo-F fluidity of movement and accuracy to stay on line about 75% of time  Pt able to loop shoelace through pegs with min difficulty to manipulate shoelace using right hand  Improvement noted with ball bounce w/mass practice       Plan: Continued skilled OT per POC     INTERVENTION COMMENTS:  Diagnosis: Thalamic Stroke   Precautions: HTN, Hypothyroidism, fall risk  FOTO: 40, with 40% limitation in Hand function   Insurance: Payor: MEDICARE / Plan: MEDICARE A AND B / Product Type: Medicare A & B Fee for Service /   4 of 10 visits, PN due 2020

## 2019-12-31 ENCOUNTER — EVALUATION (OUTPATIENT)
Dept: PHYSICAL THERAPY | Facility: REHABILITATION | Age: 79
End: 2019-12-31
Payer: MEDICARE

## 2019-12-31 ENCOUNTER — OFFICE VISIT (OUTPATIENT)
Dept: OCCUPATIONAL THERAPY | Facility: REHABILITATION | Age: 79
End: 2019-12-31
Payer: MEDICARE

## 2019-12-31 DIAGNOSIS — I63.9 THALAMIC STROKE (HCC): Primary | ICD-10-CM

## 2019-12-31 PROCEDURE — 97110 THERAPEUTIC EXERCISES: CPT

## 2019-12-31 PROCEDURE — 97150 GROUP THERAPEUTIC PROCEDURES: CPT

## 2019-12-31 PROCEDURE — 97140 MANUAL THERAPY 1/> REGIONS: CPT

## 2019-12-31 PROCEDURE — 97110 THERAPEUTIC EXERCISES: CPT | Performed by: PHYSICAL THERAPIST

## 2019-12-31 PROCEDURE — 97530 THERAPEUTIC ACTIVITIES: CPT

## 2019-12-31 PROCEDURE — 97112 NEUROMUSCULAR REEDUCATION: CPT | Performed by: PHYSICAL THERAPIST

## 2019-12-31 NOTE — PROGRESS NOTES
RE-EVALUATION / Treatment Note     Today's date: 2019  Patient name: Ananya Kinney  : 1940  MRN: [id number]  Referring provider: MEKA Holland  Dx:   Encounter Diagnosis     ICD-10-CM    1  Thalamic stroke ([facility] [address] ) I63 9      Subjective:  Patrica Whitlock reports her balance is slowly coming along  She is still having trouble with her balance, more prevalent when tired  She hasn't yet returned to all her prior activities  She is doing things slower, gets fatigue when washing dishes  Her stamina is not what it was  She is using her cane when outside the home, she'll use a cart at a grocery store  Added Plavix and a statin to her routine  Patient feels her legs are weaker  Patient discussed with her physician during her most recent visit        Objective: See treatment diary below    Exercise Diary 19   Endurance Scifit lv 2 x3 min, rest, x2      Lower Extremity Strengthening   Sit to stand, lowered plinth, 10    Heel raises, 15 - 20 x 2 sets standing on incline     Bridge, 10  Bridge, ankle on contralateral knee, 10 each     Static Balance Foam:  FTEO x1 min  FTEC x2 min (3 posterior LOB)  Semitandem EO, 2 fingers on chair x1 min, 1 lateral LOB  Semitandem EC x1 min each, 4 fingers on chair  Single leg stance on foam,2 fingers on chair x1 min each Foam:  FTEO x2 min  FTEC x2 min (2 posterior LOB)  1 minute rest break  Semitandem EO,x2 min  Semitandem EC x1 min each  Foam ball toss x2 minutes, 1 posterior LOB  Review of static HEP:  FTEO/EC, semitandem EO/EC, HTs/HNs          Dynamic Balance Tandem stepping on airex foam x2 minutes, continuous LOB, requires rail  Lateral stepping on airex foam x2 minutes  Ascend/desc clinic stairwell x2 Tandem stepping on airex foam 2 min x2, requires rail use  Walking w/ HTs/HNs x2 minutes each Walking head turns, 1 min x 2           Quadruped to stand, using anterior support, x 2     98%SpO2, 62 bpm  /84      Outcome Measures 19 2 Minute Walk Test (ft): 350 feet 6 minute walk test, ending heart rate 72 bpm    550 feet, stopping at 4 minutes into test due to back pain and hip pain   10MWT (s):  13 49 seconds 12 4 seconds with cane   5x Sit To Stand (s):  20 57 seconds 15 3 seconds   TUG (s) NT 12 3 seconds   Functional Gait Assessment  19/30     Functional Gait Assessment  2/3 Gait level surface  3/3 Change in gait speed  2/3 Gait with horizontal head turns  2/3 Gait with vertical head turns  3/3 Gait and pivot turn  1/3 Step over obstacle  1/3 Gait with narrow base of support  1/3 Gait with eyes closed  2/3 Ambulating backwards  2/3 Steps  19/30 Total score (less than 22/30 indicates increased risk of fall)         Hyun Kevin has improved in her functional leg strength and in self-selected walking speed  She continues to have functional impairments with her endurance, which does affect her balance  Encourage continued physical therapy to improve conditioning, lower extremity strength and balance  Will continue to monitor for lower extremity weakness with new statin medication  Physician is aware  Patient did perform better this visit with functional lower extremity strength task (sit to stand) than during initial evaluation 12/09/19  STG's (8 weeks):   - Patient will be independent with home exercise program in 2 weeks  MET with continued focus on aerobic activity  - Patient will demonstrate a  1m/sec increase in gait speed time to demonstrate improved functional mobility and functional independence  MET   - Patient will complete 5 time sit to  no more than 15 seconds to demonstrate improved functional mobility, functional strength, and functional independence  MET   - Patient will complete 6 minute walk test to demonstrate improved functional mobility, functional independence, and functional endurance     NOT MET       LTG's (16 weeks):  - Patient will demonstrate a  1m/sec increase in gait speed time to demonstrate improved functional mobility and functional independence  - Patient will complete 5 time sit to  no more than 12 seonds seconds to demonstrate improved functional mobility, functional strength, and functional independence  - Patient will demonstrate at least a 10% improvement on the 6 minute walk test to improved functional mobility, functional independence, and functional endurance  - Patient will score at least 22/30 on FGA to demonstrate improved dynamic balance, improved functional mobility, functional strength, and functioanl independence     PLAN OF CARE  Patient will benefit from skilled outpatient physical therapy 2 x/wk for 2 months  Therapeutic interventions to include therapeutic activity, therapeutic exercise, and neuromuscular re-education as indicated in the grids

## 2019-12-31 NOTE — PROGRESS NOTES
Daily Note     Today's date: 2019  Patient name: Kiesha Gunn  : 1940  MRN: [id number]  Referring provider: MEKA Brown  Dx:   Encounter Diagnosis   Name Primary?  Thalamic stroke (HCC) Yes                  Subjective: "You know, that massage you gave me last time really relaxed my hand  I noticed it quite a few times how relaxed my hand was, it went all the way down"  Objective: See treatment diary below    Pt participated in skilled OT focusing on fmc, in-hand manipulation, hand strengthenng and endurance  Pt completed 4 min prograde/4 min retrograde on UEB focusing on endurance  [place] placement focusing on inhand manipulation w/D1&2 shift with palm to digit translation alternating between 2 power webs placed tabletop  Pt inserted golf tees into styrofoam followed by small peg placement onto golf tees utilizing tweezers, followed by pom pom placement  Assessment: Tolerated treatment well  Patient   G inhand manipulation noted w/shift of D1&2 with minimal droppage noted  Frequent droppage noted with peg and pom pom placement due to decrease in sustained tripod grasp  Fatigue reported post session      Plan: Continued skilled OT per POC    INTERVENTION COMMENTS:  Diagnosis: Thalamic Stroke   Precautions: HTN, Hypothyroidism, fall risk  FOTO: 40, with 40% limitation in Hand function   Insurance: Payor: MEDICARE / Plan: MEDICARE A AND B / Product Type: Medicare A & B Fee for Service /   5 of 10 visits, PN due 2020:  [id number]:1  [id number]  [id number]:1

## 2019-12-31 NOTE — LETTER
2019    Misbah Fowler [facility] [address]    Patient: Silas Holguin   YOB: 1940   Date of Visit: 2019     Encounter Diagnosis     ICD-10-CM    1  Thalamic stroke [facility]) I63 9        Dear Dr Martha Donaldson: Thank you for your recent referral of Silas Holguin  Please review the attached evaluation summary from Ivory's recent visit  Please verify that you agree with the plan of care by signing the attached order  If you have any questions or concerns, please do not hesitate to call  I sincerely appreciate the opportunity to share in the care of one of your patients and hope to have another opportunity to work with you in the near future  Sincerely,    Brijesh Triplett, PT      Referring Provider:      I certify that I have read the below Plan of Care and certify the need for these services furnished under this plan of treatment while under my care  Misbah Fowler [facility] [address] [address]  VIA Facsimile: [phone]          RE-EVALUATION / Treatment Note     Today's date: 2019  Patient name: Silas Holguin  : 1940  MRN: [id number]  Referring provider: MEKA Edgar  Dx:   Encounter Diagnosis     ICD-10-CM    1  Thalamic stroke ([facility] [address] ) I63 9      Subjective:  Jose Chapman reports her balance is slowly coming along  She is still having trouble with her balance, more prevalent when tired  She hasn't yet returned to all her prior activities  She is doing things slower, gets fatigue when washing dishes  Her stamina is not what it was  She is using her cane when outside the home, she'll use a cart at a grocery store  Added Plavix and a statin to her routine  Patient feels her legs are weaker  Patient discussed with her physician during her most recent visit        Objective: See treatment diary below    Exercise Diary 19   Endurance Scifit lv 2 x3 min, rest, x2      Lower Extremity Strengthening   Sit to stand, lowered plinth, 10    Heel raises, 15 - 20 x 2 sets standing on incline     Bridge, 10  Bridge, ankle on contralateral knee, 10 each     Static Balance Foam:  FTEO x1 min  FTEC x2 min (3 posterior LOB)  Semitandem EO, 2 fingers on chair x1 min, 1 lateral LOB  Semitandem EC x1 min each, 4 fingers on chair  Single leg stance on foam,2 fingers on chair x1 min each Foam:  FTEO x2 min  FTEC x2 min (2 posterior LOB)  1 minute rest break  Semitandem EO,x2 min  Semitandem EC x1 min each  Foam ball toss x2 minutes, 1 posterior LOB  Review of static HEP:  FTEO/EC, semitandem EO/EC, HTs/HNs          Dynamic Balance Tandem stepping on airex foam x2 minutes, continuous LOB, requires rail  Lateral stepping on airex foam x2 minutes  Ascend/desc clinic stairwell x2 Tandem stepping on airex foam 2 min x2, requires rail use  Walking w/ HTs/HNs x2 minutes each Walking head turns, 1 min x 2           Quadruped to stand, using anterior support, x 2     98%SpO2, 62 bpm  /84      Outcome Measures 12/9/2019 12/31/19   2 Minute Walk Test (ft): 350 feet 6 minute walk test, ending heart rate 72 bpm    550 feet, stopping at 4 minutes into test due to back pain and hip pain   10MWT (s):  13 49 seconds 12 4 seconds with cane   5x Sit To Stand (s):  20 57 seconds 15 3 seconds   TUG (s) NT 12 3 seconds   Functional Gait Assessment  19/30     Functional Gait Assessment  2/3 Gait level surface  3/3 Change in gait speed  2/3 Gait with horizontal head turns  2/3 Gait with vertical head turns  3/3 Gait and pivot turn  1/3 Step over obstacle  1/3 Gait with narrow base of support  1/3 Gait with eyes closed  2/3 Ambulating backwards  2/3 Steps  19/30 Total score (less than 22/30 indicates increased risk of fall)         Albino Dawn has improved in her functional leg strength and in self-selected walking speed  She continues to have functional impairments with her endurance, which does affect her balance  Encourage continued physical therapy to improve conditioning, lower extremity strength and balance  Will continue to monitor for lower extremity weakness with new statin medication  Physician is aware  Patient did perform better this visit with functional lower extremity strength task (sit to stand) than during initial evaluation 12/09/19  STG's (8 weeks):   - Patient will be independent with home exercise program in 2 weeks  MET with continued focus on aerobic activity  - Patient will demonstrate a  1m/sec increase in gait speed time to demonstrate improved functional mobility and functional independence  MET   - Patient will complete 5 time sit to  no more than 15 seconds to demonstrate improved functional mobility, functional strength, and functional independence  MET   - Patient will complete 6 minute walk test to demonstrate improved functional mobility, functional independence, and functional endurance  NOT MET       LTG's (16 weeks):  - Patient will demonstrate a  1m/sec increase in gait speed time to demonstrate improved functional mobility and functional independence  - Patient will complete 5 time sit to  no more than 12 seonds seconds to demonstrate improved functional mobility, functional strength, and functional independence  - Patient will demonstrate at least a 10% improvement on the 6 minute walk test to improved functional mobility, functional independence, and functional endurance  - Patient will score at least 22/30 on FGA to demonstrate improved dynamic balance, improved functional mobility, functional strength, and functioanl independence     PLAN OF CARE  Patient will benefit from skilled outpatient physical therapy 2 x/wk for  2 months  Therapeutic interventions to include therapeutic activity, therapeutic exercise, and neuromuscular re-education as indicated in the grids

## 2020-01-02 ENCOUNTER — OFFICE VISIT (OUTPATIENT)
Dept: PHYSICAL THERAPY | Facility: REHABILITATION | Age: 80
End: 2020-01-02
Payer: MEDICARE

## 2020-01-02 ENCOUNTER — OFFICE VISIT (OUTPATIENT)
Dept: OCCUPATIONAL THERAPY | Facility: REHABILITATION | Age: 80
End: 2020-01-02
Payer: MEDICARE

## 2020-01-02 DIAGNOSIS — I63.9 THALAMIC STROKE (HCC): Primary | ICD-10-CM

## 2020-01-02 PROCEDURE — 97110 THERAPEUTIC EXERCISES: CPT

## 2020-01-02 PROCEDURE — 97140 MANUAL THERAPY 1/> REGIONS: CPT

## 2020-01-02 PROCEDURE — 97530 THERAPEUTIC ACTIVITIES: CPT

## 2020-01-02 NOTE — PROGRESS NOTES
Daily Note     Today's date: 2020  Patient name: Nam Burroughs  : 1940  MRN: [id number]  Referring provider: MEKA Irizarry  Dx:   Encounter Diagnosis     ICD-10-CM    1  Thalamic stroke ([facility] [address] ) I63 9        Start Time:   Stop Time: 1455  Total time in clinic (min): 40 minutes    Subjective: Pt reports she has a pin in her back low back, she thinks its in L4  She reports the bridges hurt her back after her previous session and would like exercises which do not hurt her back         Objective: See treatment diary below    Exercise Diary 19   Endurance Scifit lv 2 x3 min, rest, x2     Treadmill (deferred due to LBP)    Lower Extremity Strengthening     Sit to stand, lowered plinth, 10     Heel raises, 15 - 20 x 2 sets standing on incline      Bridge, 10  Bridge, ankle on contralateral knee, 10 each    1/4 squats 10x2    Heel raise 15 x2 on incline    PPT 10x3  Hooklying core isometrics, pressing hand towards thigh 10x3 bilateral  Hooklying ball roll outs 10x3  Hooklying marches 10x2    Prone on elbows x2 minutes  Seated on physioball x2 min  Seated on physioball, LEs on foam x2 min    Static Balance Foam:  FTEO x1 min  FTEC x2 min (3 posterior LOB)  Semitandem EO, 2 fingers on chair x1 min, 1 lateral LOB  Semitandem EC x1 min each, 4 fingers on chair  Single leg stance on foam,2 fingers on chair x1 min each Foam:  FTEO x2 min  FTEC x2 min (2 posterior LOB)  1 minute rest break  Semitandem EO,x2 min  Semitandem EC x1 min each  Foam ball toss x2 minutes, 1 posterior LOB  Review of static HEP:  FTEO/EC, semitandem EO/EC, HTs/HNs               Dynamic Balance Tandem stepping on airex foam x2 minutes, continuous LOB, requires rail  Lateral stepping on airex foam x2 minutes  Ascend/desc clinic stairwell x2 Tandem stepping on airex foam 2 min x2, requires rail use  Walking w/ HTs/HNs x2 minutes each Walking head turns, 1 min x 2                Quadruped to stand, using anterior support, x 2          Assessment: Tolerated treatment well  Due to pt report concerning her back, provided pt w/ alternative core strengthening exercises  Pt denied c/o pain during most of treatment, reporting onset of back pain after hooklying marches  This may be due to pt in hooklying for extended period of time, recommend breaking up supine exercises throughout session next visit  Prone positioning reported effective in reducing pain  Additionall advised pt to report to us next visit if her back pain increased after today's treatment and to stop any exercises that cause radiating pain  End of session provided pt w/ CP for LBP  Patient would benefit from continued PT      Plan: Continue per plan of care  Monitor LBP w/ core exercises next visit, continue progression of balance       INTERVENTION COMMENTS:  Diagnosis: Thalamic Stroke   Precautions: HTN, Hypothyroidism, fall risk  FOTO: 40, with 40% limitation in Hand function   Insurance: Payor: Nancy Lawson / Plan: MEDICARE A AND B / Product Type: Medicare A & B Fee for Service /   2 of 10 visits, PN due 01/12/2020

## 2020-01-02 NOTE — PROGRESS NOTES
Daily Note     Today's date: 2020  Patient name: Mavis Weaver  : 1940  MRN: [id number]  Referring provider: MEKA Nguyen  Dx:   Encounter Diagnosis   Name Primary?  Thalamic stroke (HCC) Yes                  Subjective: "I knitted the easy part, now is the difficult part, the heel"  Objective: See treatment diary below    Pt participated in skilled OT focusing on fmc, prehension patterns and strengthening  Pt completed hand strengthening exercises and as preparatory to IASTM using blue flex ring and blue extension band, 30x's  Pt engaged in bead stringing task unilaterally using right hand applying bead focusing on pad pinch threading bead by shifting bead using D3&4  Pt assembled pixie cube pattern focusing on manipulation of cube turning inhand to correct pattern piece  Assessment: Tolerated treatment well  Patient would benefit from continued OT  No noted droppage during task       Plan: Continued skilled OT per POC    INTERVENTION COMMENTS:  Diagnosis: Thalamic Stroke   Precautions: HTN, Hypothyroidism, fall risk  FOTO: 40, with 40% limitation in Hand function   Insurance: Payor: MEDICARE / Plan: MEDICARE A AND B / Product Type: Medicare A & B Fee for Service /   5 RK 35 [id number], PN due 2020

## 2020-01-06 ENCOUNTER — OFFICE VISIT (OUTPATIENT)
Dept: PHYSICAL THERAPY | Facility: REHABILITATION | Age: 80
End: 2020-01-06
Payer: MEDICARE

## 2020-01-06 ENCOUNTER — OFFICE VISIT (OUTPATIENT)
Dept: OCCUPATIONAL THERAPY | Facility: REHABILITATION | Age: 80
End: 2020-01-06
Payer: MEDICARE

## 2020-01-06 DIAGNOSIS — I63.9 THALAMIC STROKE (HCC): Primary | ICD-10-CM

## 2020-01-06 PROCEDURE — 97112 NEUROMUSCULAR REEDUCATION: CPT

## 2020-01-06 PROCEDURE — 97110 THERAPEUTIC EXERCISES: CPT

## 2020-01-06 PROCEDURE — 97140 MANUAL THERAPY 1/> REGIONS: CPT | Performed by: OCCUPATIONAL THERAPIST

## 2020-01-06 PROCEDURE — 97112 NEUROMUSCULAR REEDUCATION: CPT | Performed by: OCCUPATIONAL THERAPIST

## 2020-01-06 NOTE — PROGRESS NOTES
Daily Note     Today's date: 2020  Patient name: Valerio Zavala  : 1940  MRN: [id number]  Referring provider: MEKA Harvey  Dx: No diagnosis found  Subjective: "The massage thing that she did to my hand worked out well  It feels better"  Objective: See treatment description below    UBE x 4 minutes prograde/retrograde in seated position bilaterally with x 1 7 resistance level with focus on improved muscle endurance, gross grasp, and proximal strength  IASTM to R hand with focus on stiffness reduction and improved sensation, followed by gross grasp with x 3 second isometric hold with hand strengtheners (blue) and digit extension (blue) focusing on improved  strength and isometric strength  Pt performed 2 sets a 10 reps  Seated functional reach crossing midline for marble retrieval and placement on peg board utilizing green resistive clothes pin focusing on improve pinch/intrinsic strength and hand to target accuracy  OTR advanced functional task to palm to digit translation with focus on improved in hand manipulation  Stringing bead on flaccid string unilaterally followed by knot tying around bead with focus on in-hand manipulation, B/L coordination, and FMC/prehension  Assessment: Tolerated treatment well  Patient would benefit from continued OT     Pt demo with mild fatigue with UBE demands with requirement to concluded one minute earlier than recommended time  Pt with self-report of reduced muscle stiffness in R hand after manual tx  Pt with self-report of Mod difficulties with sustained grasp and digit extension utilizing hand strengtheners  Pt with no droppage when utilizing resistive clothes pin for marble retrieval, though demo x5% droppage with in-hand manipulation demands  Pt demo with no difficulties with bead stringing and knot tying with G prehension and B/L coordination evident  Plan: Continue per plan of care           INTERVENTION COMMENTS:  Diagnosis: Thalamic Stroke   Precautions: HTN, Hypothyroidism, fall risk  FOTO: 40, with 40% limitation in Hand function   Insurance: Payor: Ozzie Walker / Plan: MEDICARE A AND B / Product Type: Medicare A & B Fee for Service /   7 of 10 visits, PN due 01/12/2020

## 2020-01-06 NOTE — PROGRESS NOTES
Daily Note     Today's date: 2020  Patient name: Kiesha Gunn  : 1940  MRN: [id number]  Referring provider: MEKA Brown  Dx:   Encounter Diagnosis     ICD-10-CM    1  Thalamic stroke ([facility] [address] ) I63 9        Start Time: 1000  Stop Time: 1045  Total time in clinic (min): 45 minutes    Subjective: Pt reports pressure in her low back but no pain         Objective: See treatment diary below    Exercise Diary 19   Endurance Scifit lv 2 x3 min, rest, x2     Treadmill (deferred due to LBP)     Lower Extremity Strengthening     Sit to stand, lowered plinth, 10     Heel raises, 15 - 20 x 2 sets standing on incline      Bridge, 10  Bridge, ankle on contralateral knee, 10 each    1/4 squats 10x2    Heel raise 15 x2 on incline    PPT 10x3  Hooklying core isometrics, pressing hand towards thigh 10x3 bilateral  Hooklying ball roll outs 10x3  Hooklying marches 10x2    Prone on elbows x2 minutes  Seated on physioball x2 min  Seated on physioball, LEs on foam x2 min  1/4 squats 10x2    Heel raise 15 x2 on incline    PPT 10x3  Hooklying core isometrics, pressing hand towards thigh 10x3 bilateral  Hooklying ball roll outs 10x3  Hooklying marches 10x2  SLR 10x2 b/l        Static Balance Foam:  FTEO x1 min  FTEC x2 min (3 posterior LOB)  Semitandem EO, 2 fingers on chair x1 min, 1 lateral LOB  Semitandem EC x1 min each, 4 fingers on chair  Single leg stance on foam,2 fingers on chair x1 min each Foam:  FTEO x2 min  FTEC x2 min (2 posterior LOB)  1 minute rest break  Semitandem EO,x2 min  Semitandem EC x1 min each  Foam ball toss x2 minutes, 1 posterior LOB  Review of static HEP:  FTEO/EC, semitandem EO/EC, HTs/HNs                Dynamic Balance Tandem stepping on airex foam x2 minutes, continuous LOB, requires rail  Lateral stepping on airex foam x2 minutes  Ascend/desc clinic stairwell x2 Tandem stepping on airex foam 2 min x2, requires rail use  Walking w/ HTs/HNs x2 minutes each Walking head turns, 1 min x 2        Tandem walking 15 ft x8 in parallel bars  EC walking in parallel bars 15ft x8  Low obstacle step overs 20ft x8  Backwards walking 20ft x8  Cone toe taps x30  Foam STS x10         Quadruped to stand, using anterior support, x 2       Assessment: Tolerated treatment well  Continued w/ core isometrics this visit to control low back pain, changing position to standing every 2 exercises, this was reported effective in decreasing pt pain  Pt required occasional assistance from parallel bars for balance assistance, however primarily she was able to correct w/ hip strategy  Pt consistently clears LEs w/ obstacles, however noted lateral instability after each step  End of session reviewed w/ pt importance of maintaining a regular walking program, providing pt w/ indoor options such as malls or other large spaces, as well as exercise equipment, pt reported she would try  End of session pt reports her low back pain is much more in control and that she feels looser, however she does request a CP at end of session, which was provided  Patient would benefit from continued PT      Plan: Continue per plan of care  Continue w/ core isometric exercises, monitoring LBP throughout   Continue to progress balance challenges on foam       INTERVENTION COMMENTS:  Diagnosis: Thalamic Stroke   Precautions: HTN, Hypothyroidism, fall risk  FOTO: 40, with 40% limitation in Hand function   Insurance: Payor: Lelia Lawrence / Plan: MEDICARE A AND B / Product Type: Medicare A & B Fee for Service /   2 of 10 visits, PN due 01/12/2020

## 2020-01-08 ENCOUNTER — OFFICE VISIT (OUTPATIENT)
Dept: PHYSICAL THERAPY | Facility: REHABILITATION | Age: 80
End: 2020-01-08
Payer: MEDICARE

## 2020-01-08 ENCOUNTER — OFFICE VISIT (OUTPATIENT)
Dept: OCCUPATIONAL THERAPY | Facility: REHABILITATION | Age: 80
End: 2020-01-08
Payer: MEDICARE

## 2020-01-08 DIAGNOSIS — I63.9 THALAMIC STROKE (HCC): Primary | ICD-10-CM

## 2020-01-08 PROCEDURE — 97110 THERAPEUTIC EXERCISES: CPT

## 2020-01-08 PROCEDURE — 97530 THERAPEUTIC ACTIVITIES: CPT

## 2020-01-08 PROCEDURE — 97112 NEUROMUSCULAR REEDUCATION: CPT

## 2020-01-08 NOTE — PROGRESS NOTES
Daily Note     Today's date: 2020  Patient name: Roni Montilla  : 1940  MRN: [id number]  Referring provider: MEKA Paris  Dx:   Encounter Diagnosis   Name Primary?  Thalamic stroke (HCC) Yes                  Subjective: "The marbles were hard from last time, we need to do that again"  Objective: See treatment diary below    Pt participated in skilled OT focusing on fmc, grasp/digit manipulation and inhand manipulation  Pt engaged in various fm activities facilitating hook grasp with manipulation of digits initiating task with composit  working dowel to hook grasp with min difficulty  In hand manipulation for item retrieval gathering up to 6 items in hand and manipulating between D1&2 to place in container with palm to digit translation  [place] knitting to stabilize yard in D3&4 while at same time wrapping yarn around loom  Assessment: Tolerated treatment well  Patient would benefit from continued OT  No noted difficulty or droppage during task manipulation task  Pt able to secure hook with right hand while pulling yarn stitch over loom, min difficulty noted to stabilize yarn in D4&5 while wrapping around loom with D1&2       Plan: Continued skilled OT per POC    INTERVENTION COMMENTS:  Diagnosis: Thalamic Stroke   Precautions: HTN, Hypothyroidism, fall risk  FOTO: 40, with 40% limitation in Hand function   Insurance: Payor: MEDICARE / Plan: MEDICARE A AND B / Product Type: Medicare A & B Fee for Service /   8 of 10 visits, PN due 2020

## 2020-01-08 NOTE — PROGRESS NOTES
Daily Note     Today's date: 2020  Patient name: Alejandro Taylor  : 1940  MRN: [id number]  Referring provider: MEKA Whitney  Dx:   Encounter Diagnosis     ICD-10-CM    1  Thalamic stroke (HCC) I63 9        Start Time: 1600  Stop Time: 1640  Total time in clinic (min): 40 minutes    Subjective: Pt reports she walked in [facility] the other day with her grocery cart  She was very tired doing this and relied heavily on the cart to support her  Her legs are still sore because of this        Objective: See treatment diary below    Exercise Diary 19 1   Endurance     Treadmill (deferred due to LBP)      Lower Extremity Strengthening   Sit to stand, lowered plinth, 10     Heel raises, 15 - 20 x 2 sets standing on incline      Bridge, 10  Bridge, ankle on contralateral knee, 10 each    1/4 squats 10x2    Heel raise 15 x2 on incline    PPT 10x3  Hooklying core isometrics, pressing hand towards thigh 10x3 bilateral  Hooklying ball roll outs 10x3  Hooklying marches 10x2    Prone on elbows x2 minutes  Seated on physioball x2 min  Seated on physioball, LEs on foam x2 min  1/4 squats 10x2    Heel raise 15 x2 on incline    PPT 10x3  Hooklying core isometrics, pressing hand towards thigh 10x3 bilateral  Hooklying ball roll outs 10x3  Hooklying marches 10x2  SLR 10x2 b/l      HEP:  PPT x10  Hooking core isometrics, pressing hand to thigh x10  Bilateral isometric hip flexion x10  Hooklying shoulder flexion x10  Side lunge x10 each side  Fwd lunge x10 each foot     Static Balance Foam:  FTEO x2 min  FTEC x2 min (2 posterior LOB)  1 minute rest break  Semitandem EO,x2 min  Semitandem EC x1 min each  Foam ball toss x2 minutes, 1 posterior LOB  Review of static HEP:  FTEO/EC, semitandem EO/EC, HTs/HNs                 Dynamic Balance Tandem stepping on airex foam 2 min x2, requires rail use  Walking w/ HTs/HNs x2 minutes each Walking head turns, 1 min x 2        Tandem walking 15 ft x8 in parallel bars  EC walking in parallel bars 15ft x8  Low obstacle step overs 20ft x8  Backwards walking 20ft x8  Cone toe taps x30  Foam STS x10 Tandem walking in parallel bars w/ foam to firm transfer 15ft x4  Sit to stand on foam 5x2       Quadruped to stand, using anterior support, x 2        Assessment: Tolerated treatment well  Encouraged pt to continue w/ walking program, however educated pt how to complete walking safer, choosing short distances to start and to gradually progress distance in order to always have access to a chair that she can walk to safely  Provided pt w/ HEP, focusing on core strengthening w/ isometrics, as pt reports pain w/ more aggressive exercises  Pt completes all exercises w/ HEP correctly, however educated pt to complete lunge exercises in front of support, as she is not always consistent in clearing leading LE sufficiently  Pt reports she has a kitchen table  With foam sit to stands pt initially experiences posterior LOB when sitting, but w/ education for correct weight shifting this improves and no further LOB noted  At end of session pt reported LE fatigue and requests cold packs for both quadriceps  Patient would benefit from continued PT      Plan: Continue per plan of care    Follow up w/ core strengthening HEP if necessary, progress balance to incorporate HTs on foam      INTERVENTION COMMENTS:  Diagnosis: Thalamic Stroke   Precautions: HTN, Hypothyroidism, fall risk  FOTO: 40, with 40% limitation in Hand function   Insurance: Payor: Amanda Kumar / Plan: MEDICARE A AND B / Product Type: Medicare A & B Fee for Service /   2 of 10 visits, PN due 01/12/2020

## 2020-01-14 ENCOUNTER — OFFICE VISIT (OUTPATIENT)
Dept: OCCUPATIONAL THERAPY | Facility: REHABILITATION | Age: 80
End: 2020-01-14
Payer: MEDICARE

## 2020-01-14 ENCOUNTER — OFFICE VISIT (OUTPATIENT)
Dept: PHYSICAL THERAPY | Facility: REHABILITATION | Age: 80
End: 2020-01-14
Payer: MEDICARE

## 2020-01-14 DIAGNOSIS — I63.9 THALAMIC STROKE (HCC): Primary | ICD-10-CM

## 2020-01-14 PROCEDURE — 97112 NEUROMUSCULAR REEDUCATION: CPT | Performed by: PHYSICAL THERAPIST

## 2020-01-14 PROCEDURE — 97530 THERAPEUTIC ACTIVITIES: CPT

## 2020-01-14 PROCEDURE — 97110 THERAPEUTIC EXERCISES: CPT | Performed by: PHYSICAL THERAPIST

## 2020-01-14 PROCEDURE — 97140 MANUAL THERAPY 1/> REGIONS: CPT

## 2020-01-14 PROCEDURE — 97150 GROUP THERAPEUTIC PROCEDURES: CPT

## 2020-01-14 NOTE — PROGRESS NOTES
Daily Note     Today's date: 2020  Patient name: Clarence Rich  : 1940  MRN: [id number]  Referring provider: MEKA Davis  Dx:   Encounter Diagnosis   Name Primary?  Thalamic stroke (HCC) Yes                  Subjective: "I try to put my pills in the pill box like this"  Objective: See treatment diary below    Pt participated in skilled OT focusing on endurance, proximal strengthening, fmc, w/digit isolation and inhand manipulation  UEB for 3 5 min prograde/3 5 min retrograde w/o resistance  IASTM to right hand followed by therex using blue extension band for digit extension, 3x10 for hand strengthening  Pt engaged right hand in small peg placement tabletop sustaining 5 small pegs in palm w/digit translation to D1&2 followed by small pony bead placement utilizing same method  Assessment: Tolerated treatment well  Patient would benefit from continued OT  Pt completed peg placement task accurately placing small pegs in 5 rows of 5, max droppage noted to apply small beads manipulating and sustaining beads using pad pinch       Plan: Continued skilled OT per POC     INTERVENTION COMMENTS:  Diagnosis: Thalamic Stroke   Precautions: HTN, Hypothyroidism, fall risk  FOTO: 40, with 40% limitation in Hand function   Insurance: Payor: Hira Loya / Plan: MEDICARE A AND B / Product Type: Medicare A & B Fee for Service /   9 of 10 visits, PN due 2020:  [id number] self directed theract  [id number]  [id number]

## 2020-01-14 NOTE — PROGRESS NOTES
Daily Note     Today's date: 2020  Patient name: Pradeep Georges  : 1940  MRN: [id number]  Referring provider: MEKA Adair  Dx:   Encounter Diagnosis     ICD-10-CM    1  Thalamic stroke (HCC) I63 9                   Subjective: Pt reports she did walking this weekend in the store when looking for a ball  She walked outside briefly  Her legs still continue to feel weak since starting Plavix  She saw dietician yesterday  Her ankle feels the same since last visit        Objective: See treatment diary below    Exercise Diary 19   Endurance   Treadmill (deferred due to LBP)        Lower Extremity Strengthening Sit to stand, lowered plinth, 10     Heel raises, 15 - 20 x 2 sets standing on incline      Bridge, 10  Bridge, ankle on contralateral knee, 10 each    1/4 squats 10x2    Heel raise 15 x2 on incline    PPT 10x3  Hooklying core isometrics, pressing hand towards thigh 10x3 bilateral  Hooklying ball roll outs 10x3  Hooklying marches 10x2    Prone on elbows x2 minutes  Seated on physioball x2 min  Seated on physioball, LEs on foam x2 min  1/4 squats 10x2    Heel raise 15 x2 on incline    PPT 10x3  Hooklying core isometrics, pressing hand towards thigh 10x3 bilateral  Hooklying ball roll outs 10x3  Hooklying marches 10x2  SLR 10x2 b/l      HEP:  PPT x10  Hooking core isometrics, pressing hand to thigh x10  Bilateral isometric hip flexion x10  Hooklying shoulder flexion x10  Side lunge x10 each side  Fwd lunge x10 each foot   PPT x 10    Bilateral hip flexion hand to thigh x 10    Sit to stand on foam x 30 seconds (LE weakness)    Sit to stand lowered plinth, x 10 x 2   Static Balance         Dynamic Balance Walking head turns, 1 min x 2        Tandem walking 15 ft x8 in parallel bars  EC walking in parallel bars 15ft x8  Low obstacle step overs 20ft x8  Backwards walking 20ft x8  Cone toe taps x30  Foam STS x10 Tandem walking in parallel bars w/ foam to firm transfer 15ft x4  Sit to stand on foam 5x2 Overground walking with head turns, then trunk rotation with ball passing x 10 minutes    Tandem walk on foam, then lateral stepping on foam x 20 ft x 4 x 2    Tandem walk with eyes closed x 20 ft x 6         Quadruped to stand, using anterior support, x 2    Standing runners stretch bilateral x 20 seconds x 3     Assessment: Patient tolerated treatment well today  She had difficulty with sit to stands with foam under feet after performing tandem walking on foams, requesting to perform table exercises before continuing  Next visit try to perform this exercise before dynamic balance exercises  She experience minor LOB with tandem walking with eyes closes, but self corrected with step reaction  Therapist educated patient on performing LE stretching after walking or when LE feel like they are cramping up  Patient will continue to benefit from physical therapy to improve dynamic/static balance and strength  Plan: Continue per plan of care  Plan to trial sit to stand with foam under feet before dynamic balance exercises  Continue to challenge dynamic balance with obstacles to step on and over next visit as tolerated   Treatment performed by Young Dutta, KARY with supervision by Verner Piedmont, PT DPT       INTERVENTION COMMENTS:  Diagnosis: Thalamic Stroke   Precautions: HTN, Hypothyroidism, fall risk  FOTO: 40, with 40% limitation in Hand function   Insurance: Payor: Glen Ortiz / Plan: MEDICARE A AND B / Product Type: Medicare A & B Fee for Service /   2 of 10 visits, PN due 01/12/2020

## 2020-01-16 ENCOUNTER — EVALUATION (OUTPATIENT)
Dept: OCCUPATIONAL THERAPY | Facility: REHABILITATION | Age: 80
End: 2020-01-16
Payer: MEDICARE

## 2020-01-16 ENCOUNTER — OFFICE VISIT (OUTPATIENT)
Dept: PHYSICAL THERAPY | Facility: REHABILITATION | Age: 80
End: 2020-01-16
Payer: MEDICARE

## 2020-01-16 DIAGNOSIS — I63.9 THALAMIC STROKE (HCC): Primary | ICD-10-CM

## 2020-01-16 PROCEDURE — 97112 NEUROMUSCULAR REEDUCATION: CPT

## 2020-01-16 PROCEDURE — 97110 THERAPEUTIC EXERCISES: CPT

## 2020-01-16 PROCEDURE — 97112 NEUROMUSCULAR REEDUCATION: CPT | Performed by: OCCUPATIONAL THERAPIST

## 2020-01-16 NOTE — PROGRESS NOTES
Daily Note     Today's date: 2020  Patient name: Abel Evans  : 1940  MRN: [id number]  Referring provider: MEKA De La Cruz  Dx:   Encounter Diagnosis     ICD-10-CM    1  Thalamic stroke ([facility] [address] ) I63 9        Start Time: 1416  Stop Time: 1500  Total time in clinic (min): 44 minutes    Subjective: Pt reports she hurt the sole of her right foot about an hour ago  She was in the tub and her leg was resting on the edge of the tub, and her foot slipped an hit the faucet handle  She denies any open wound, but reports bruising on the plantar aspect of her right foot  She denies pain currently but reports that it will hurt her if she walks for too long         Objective: See treatment diary below    Exercise Diary    Endurance Treadmill (deferred due to LBP)         Lower Extremity Strengthening 1/4 squats 10x2    Heel raise 15 x2 on incline    PPT 10x3  Hooklying core isometrics, pressing hand towards thigh 10x3 bilateral  Hooklying ball roll outs 10x3  Hooklying marches 10x2    Prone on elbows x2 minutes  Seated on physioball x2 min  Seated on physioball, LEs on foam x2 min  1/4 squats 10x2    Heel raise 15 x2 on incline    PPT 10x3  Hooklying core isometrics, pressing hand towards thigh 10x3 bilateral  Hooklying ball roll outs 10x3  Hooklying marches 10x2  SLR 10x2 b/l      HEP:  PPT x10  Hooking core isometrics, pressing hand to thigh x10  Bilateral isometric hip flexion x10  Hooklying shoulder flexion x10  Side lunge x10 each side  Fwd lunge x10 each foot   PPT x 10    Bilateral hip flexion hand to thigh x 10    Sit to stand on foam x 30 seconds (LE weakness)    Sit to stand lowered plinth, x 10 x 2 PPT x 10    Bilateral hip flexion hand to thigh x 10    Sit to stand on foam from 17" chair, 30 seconds x3  6,6,7, w/ 2 handrails    Sit to stands on declined wedge x6 minutes   Static Balance        Dynamic Balance  Tandem walking 15 ft x8 in parallel bars  EC walking in parallel bars 15ft x8  Low obstacle step overs 20ft x8  Backwards walking 20ft x8  Cone toe taps x30  Foam STS x10 Tandem walking in parallel bars w/ foam to firm transfer 15ft x4  Sit to stand on foam 5x2 Overground walking with head turns, then trunk rotation with ball passing x 10 minutes    Tandem walk on foam, then lateral stepping on foam x 20 ft x 4 x 2    Tandem walk with eyes closed x 20 ft x 6     Walking w/ HTs 20ft x4  Walking w/ HNs 20ft x4  High marches 20ft x4  High marches w/ opposite hand tap 20ft x2  Tandem walking on foam x4 each direction          Standing runners stretch bilateral x 20 seconds x 3      Assessment: Patient tolerated treatment well today  Pt continued intially w/ difficulty w/ foam under feet for STS, and she required use of BUEs in order to rise from chair  Educated to pt correct seated weight shifting technique, and completed block practice on this w/ declined wedge, pt was able to complete this activity w/out use of UEs, which carried over to repeated STS on foam, and pt was able to complete STS on this as well w/out any use of UEs  Pt experiences lateral deviation both w/ HTs and HNs  Observed during marches pt does have core instability and has difficulty maintaining balance during activity, but no LOB observed today  Pt reports fatigue in quads during session, and requires rest breaks between exercises  At end of session pt provided w/ cold packs to place on quadriceps for decreased muscle soreness  Patient will continue to benefit from physical therapy to improve dynamic/static balance and strength  Plan: Continue per plan of care  Plan to trial sit to stand with foam under feet before dynamic balance exercises  Continue to challenge dynamic balance with obstacles to step on and over next visit as tolerated       INTERVENTION COMMENTS:  Diagnosis: Thalamic Stroke   Precautions: HTN, Hypothyroidism, fall risk  FOTO: 40, with 40% limitation in Hand function   Insurance: Payor: Stuart Bar / Plan: MEDICARE A AND B / Product Type: Medicare A & B Fee for Service /   2 of 10 visits, PN due 01/12/2020

## 2020-01-16 NOTE — LETTER
January 20, 2020    Zaid Bose, Misbah [facility] [address]    Patient: Valerio Zavala   YOB: 1940   Date of Visit: 1/16/2020     Encounter Diagnosis     ICD-10-CM    1  Thalamic stroke [facility]) I63 9        Dear Dr James Zuniga: Thank you for your recent referral of Valerio Zavala  Please review the attached evaluation summary from Ivory's recent visit  Please verify that you agree with the plan of care by signing the attached order  If you have any questions or concerns, please do not hesitate to call  I sincerely appreciate the opportunity to share in the care of one of your patients and hope to have another opportunity to work with you in the near future  Sincerely,    Stephany Hodges, OT      Referring Provider:     I certify that I have read the below Plan of Care and certify the need for these services furnished under this plan of treatment while under my care  Misbah Guerra [facility] [address]  [address] [facility] [address]  VIA Facsimile: [phone]          OCCUPATIONAL THERAPY RE- EVALUATION:    01/16/2020  Valerio Zavala  1940  [id number]  MEKA Harvey  No diagnosis found  Subjective Evaluation    Quality of life: good          "I do fairly good"  PATIENT GOAL: "I want to get back to where I was before my stroke "    HISTORY OF PRESENT ILLNESS:     Pt is a [de-identified] y o  female who was referred to Occupational Therapy s/p Thalamic Stroke  Pt presented [facility] with complaint of right sided paresthesia  The symptoms waxed and waned one day prior to ED evaluation  Reports missing several doses of her BP meds at home  MRI with evidence of thalamic stroke  She was followed by Neurology during her stay and received 3 days of aspirin and was started on clopidogrel  Atorvastatin was also started  A 2D echo with enlarged left atrium, and Cardiology consulted for placement of loop recorder   She underwent loop recorder insertion on discharge day and is feeling well  She is going to follow up with PT/OT as an outpatient  She will also follow up with Cardiology and Neurology as an outpatient  PT referred Pt for OT services 2* complaints of R side weakness and changes in handwriting  Pt reports continuing to notice R sided weakness, altered sensation throughout RUE, stiffness with IR AROM, and decreased fluidity/legibility of handwriting  Pt lives in a two story home independently  Pt currently performing all ADLs at independent status, though demo with Min A with IADLs  Pt is a retired  at multiple locations  Pt loss role of  at this time since CVA and also since no car longer owned  PMH: No past medical history on file  Pain Levels:     Restin    With Activity:  0    Objective     Functional Assessment        Comments  See impairment section for further details  Impairment Observations:  1  R hand weakness  2  Decreased fluidity/legibility of handwriting  3  Impaired R proprioception  4  Altered sensation throughout RUE    Dynamometer Position #2:   R: 46    58  L: 50    55    R hand dominant  Action:  3 point pinch: R 11 5 and L 11    R 12 5, L 10 5  2 point pinch: R 6 and L 7  R 8, L 7  Lateral pinch: R 11 and L 11    R 13, L 11    9-hole Peg Test: RUE: 18 94 seconds, LUE: 20 96 seconds    R 17 83, L 20 34    Functional Dexterity Test:  R 29 71 seconds, L 37 26 seconds    Myofilaments: B/L 2 83, no difficulties with distinguishing between hot or cold temperatures    B/L AROM:  Shoulder elevation: B/L full  Shoulder FF: B/L full  Shoulder ABD: B/L full  ER/IR: B/L full  Elbow ext/flex: B/L full  Sup/Pron: B/L full  Wrist flex/ext: B/L full  Composite: B/L full  Hook: B/L full  Opposition: B/L intact  Finger to nose: impaired R proprioception, L intact     Resolved, R intact  Dysdiadochokinesia: B/L intact    MMT: R 4+/5 throughout, L 4+/5 throughout      Assessment  Assessment details: See skilled analysis for further details  Skilled Analysis:  Pt is a [de-identified] y o  female referred to Occupational Therapy s/p Thalamic Stroke  Pt presents with R hand weakness, decreased fluidity/legibility of handwriting, impaired R proprioception, and altered sensation throughout RUE affecting functional use and engagement in meaningful occupations/IADLs  Pt will benefit from skilled Occupational Therapy services 2x/week for 6-8 weeks with focus on self-care management, neuro re-ed, manual tx, and sensory re-ed to improve on the above deficits, improve functional use of RUE, maximize highest level of independence, and enhance overall QOL  Pt demo with G functional progression towards goals in POC evident by improved R /pinch strength, FMC/prehension, B/L coordination, and in-hand manipulation    Pt biggest limitation at this time point is continuing decreased R muscle endurance and RUE strength limiting engagement in IADLs  OTR recommending Pt to continue skilled OT services 2x/week for 2-4 weeks with focus on the above deficits, increase engagement with IADLs, and enhance overall QOL       Short Term Goals:  - Pt will increase R prehension patterns for improved tripod with utensil management and dynamic tripod grasp with writing utensil with <5% droppage and 70% improved fluidity/legibility 4 weeks-- MET  - Pt will increase proprioception of RUE hand to target x 50% for improved functional reach vision occluded with ADL/IADL tasks 4 weeks-- MET  - Pt will demo with G carryover of Home Exercise Program to improve functional progression towards goals in Plan of care and for improved functional use of RUE 4 weeks-- MET  - Pt will increase automaticity of R UE to 75% for improved grasp release of tabletop items for improved functional performance with ADLs/IADLs/salient tasks 4 weeks-- MET  - Pt will increase rate of manipulation for all R FM tests x 70% for improved functional performance with salient tasks 4 weeks-- MET  - Pt will increase R UE to Mod I with <10% cuing for tabletop tasks for improved functional performance of life roles and salient tasks 4 weeks-- MET      Long Term Goals:  - Pt will increase automaticity of  R UE to 95% for resumption of B integrative tasks-- MET  - Pt will increase R prehension patterns for improved tripod with utensil management and dynamic tripod grasp with writing utensil with <0% droppage and 90% improved fluidity/legibility-- MET   - Pt will increase proprioception of RUE hand to target x 85% for improved functional reach vision occluded with ADL/IADL tasks-- MET  - Pt will increase automaticity of R UE to 90% for improved grasp release of tabletop items for improved functional performance with ADLs/IADLs/salient tasks-- PARTIALLY MET  - Pt will increase rate of manipulation for all R FM tests x 90% for improved functional performance with salient tasks-- MET  - Pt will increase R UE to independent status with 0% cuing for tabletop tasks for improved functional performance of life roles and salient tasks-- PARTIALLY MET    OTHER PLANNED THERAPY INTERVENTIONS:   Supine, seated, and in stance neuro re-ed  FMC/prehension  Timed Trials  Manual tx  Sensory re-ed  Seated functional reach: crossing midline  Supine place and hold  WBearing strategies    Handwriting  Hand to target    INTERVENTION COMMENTS:  Diagnosis: Thalamic Stroke   Precautions: HTN, Hypothyroidism, fall risk  FOTO: 40, with 40% limitation in Hand function   , 40, with 20% limitation in Hand function  Insurance: Payor: MEDICARE / Plan: MEDICARE A AND B / Product Type: Medicare A & B Fee for Service /   4 of 10 visits, PN due 02/16/2020

## 2020-01-16 NOTE — PROGRESS NOTES
OCCUPATIONAL THERAPY RE- EVALUATION:    2020  Clarence Rich  1940  [id number]   MEKA Galan  No diagnosis found  Subjective Evaluation    Quality of life: good          "I do fairly good"  PATIENT GOAL: "I want to get back to where I was before my stroke "    HISTORY OF PRESENT ILLNESS:     Pt is a [de-identified] y o  female who was referred to Occupational Therapy s/p Thalamic Stroke  Pt presented to [facility] on 2019 with complaint of right sided paresthesia  The symptoms waxed and waned one day prior to ED evaluation  Reports missing several doses of her BP meds at home  MRI with evidence of thalamic stroke  She was followed by Neurology during her stay and received 3 days of aspirin and was started on clopidogrel  Atorvastatin was also started  A 2D echo with enlarged left atrium, and Cardiology consulted for placement of loop recorder  She underwent loop recorder insertion on discharge day and is feeling well  She is going to follow up with PT/OT as an outpatient  She will also follow up with Cardiology and Neurology as an outpatient  PT referred Pt for OT services 2* complaints of R side weakness and changes in handwriting  Pt reports continuing to notice R sided weakness, altered sensation throughout RUE, stiffness with IR AROM, and decreased fluidity/legibility of handwriting  Pt lives in a two story home independently  Pt currently performing all ADLs at independent status, though demo with Min A with IADLs  Pt is a retired  at multiple locations  Pt loss role of  at this time since CVA and also since no car longer owned  PMH: No past medical history on file  Pain Levels:     Restin    With Activity:  0    Objective     Functional Assessment        Comments  See impairment section for further details  Impairment Observations:  1  R hand weakness  2  Decreased fluidity/legibility of handwriting  3  Impaired R proprioception  4   Altered sensation throughout RUE    Dynamometer Position #2:   R: 46    58  L: 50    55    R hand dominant  Action:  3 point pinch: R 11 5 and L 11    R 12 5, L 10 5  2 point pinch: R 6 and L 7  R 8, L 7  Lateral pinch: R 11 and L 11    R 13, L 11    9-hole Peg Test: RUE: 18 94 seconds, LUE: 20 96 seconds    R 17 83, L 20 34    Functional Dexterity Test:  R 29 71 seconds, L 37 26 seconds    Myofilaments: B/L 2 83, no difficulties with distinguishing between hot or cold temperatures    B/L AROM:  Shoulder elevation: B/L full  Shoulder FF: B/L full  Shoulder ABD: B/L full  ER/IR: B/L full  Elbow ext/flex: B/L full  Sup/Pron: B/L full  Wrist flex/ext: B/L full  Composite: B/L full  Hook: B/L full  Opposition: B/L intact  Finger to nose: impaired R proprioception, L intact    Resolved, R intact  Dysdiadochokinesia: B/L intact    MMT: R 4+/5 throughout, L 4+/5 throughout      Assessment  Assessment details: See skilled analysis for further details  Skilled Analysis:  Pt is a [de-identified] y o  female referred to Occupational Therapy s/p Thalamic Stroke  Pt presents with R hand weakness, decreased fluidity/legibility of handwriting, impaired R proprioception, and altered sensation throughout RUE affecting functional use and engagement in meaningful occupations/IADLs  Pt will benefit from skilled Occupational Therapy services 2x/week for 6-8 weeks with focus on self-care management, neuro re-ed, manual tx, and sensory re-ed to improve on the above deficits, improve functional use of RUE, maximize highest level of independence, and enhance overall QOL  Pt demo with G functional progression towards goals in POC evident by improved R /pinch strength, FMC/prehension, B/L coordination, and in-hand manipulation    Pt biggest limitation at this time point is continuing decreased R muscle endurance and RUE strength limiting engagement in IADLs    OTR recommending Pt to continue skilled OT services 2x/week for 2-4 weeks with focus on the above deficits, increase engagement with IADLs, and enhance overall QOL       Short Term Goals:  - Pt will increase R prehension patterns for improved tripod with utensil management and dynamic tripod grasp with writing utensil with <5% droppage and 70% improved fluidity/legibility 4 weeks-- MET  - Pt will increase proprioception of RUE hand to target x 50% for improved functional reach vision occluded with ADL/IADL tasks 4 weeks-- MET  - Pt will demo with G carryover of Home Exercise Program to improve functional progression towards goals in Plan of care and for improved functional use of RUE 4 weeks-- MET  - Pt will increase automaticity of R UE to 75% for improved grasp release of tabletop items for improved functional performance with ADLs/IADLs/salient tasks 4 weeks-- MET  - Pt will increase rate of manipulation for all R FM tests x 70% for improved functional performance with salient tasks 4 weeks-- MET  - Pt will increase R UE to Mod I with <10% cuing for tabletop tasks for improved functional performance of life roles and salient tasks 4 weeks-- MET      Long Term Goals:  - Pt will increase automaticity of  R UE to 95% for resumption of B integrative tasks-- MET  - Pt will increase R prehension patterns for improved tripod with utensil management and dynamic tripod grasp with writing utensil with <0% droppage and 90% improved fluidity/legibility-- MET   - Pt will increase proprioception of RUE hand to target x 85% for improved functional reach vision occluded with ADL/IADL tasks-- MET  - Pt will increase automaticity of R UE to 90% for improved grasp release of tabletop items for improved functional performance with ADLs/IADLs/salient tasks-- PARTIALLY MET  - Pt will increase rate of manipulation for all R FM tests x 90% for improved functional performance with salient tasks-- MET  - Pt will increase R UE to independent status with 0% cuing for tabletop tasks for improved functional performance of life roles and salient tasks-- PARTIALLY MET    OTHER PLANNED THERAPY INTERVENTIONS:   Supine, seated, and in stance neuro re-ed  FMC/prehension  Timed Trials  Manual tx  Sensory re-ed  Seated functional reach: crossing midline  Supine place and hold  WBearing strategies    Handwriting  Hand to target    INTERVENTION COMMENTS:  Diagnosis: Thalamic Stroke   Precautions: HTN, Hypothyroidism, fall risk  FOTO: 40, with 40% limitation in Hand function   , 40, with 20% limitation in Hand function  Insurance: Payor: MEDICARE / Plan: MEDICARE A AND B / Product Type: Medicare A & B Fee for Service /   4 of 10 visits, PN due 02/16/2020

## 2020-01-20 ENCOUNTER — OFFICE VISIT (OUTPATIENT)
Dept: OCCUPATIONAL THERAPY | Facility: REHABILITATION | Age: 80
End: 2020-01-20
Payer: MEDICARE

## 2020-01-20 ENCOUNTER — TRANSCRIBE ORDERS (OUTPATIENT)
Dept: PHYSICAL THERAPY | Facility: REHABILITATION | Age: 80
End: 2020-01-20

## 2020-01-20 ENCOUNTER — OFFICE VISIT (OUTPATIENT)
Dept: PHYSICAL THERAPY | Facility: REHABILITATION | Age: 80
End: 2020-01-20
Payer: MEDICARE

## 2020-01-20 DIAGNOSIS — I63.9 THALAMIC STROKE (HCC): Primary | ICD-10-CM

## 2020-01-20 DIAGNOSIS — I63.9 IMPENDING CEREBROVASCULAR ACCIDENT (HCC): Primary | ICD-10-CM

## 2020-01-20 PROCEDURE — 97110 THERAPEUTIC EXERCISES: CPT

## 2020-01-20 PROCEDURE — 97110 THERAPEUTIC EXERCISES: CPT | Performed by: OCCUPATIONAL THERAPIST

## 2020-01-20 PROCEDURE — 97112 NEUROMUSCULAR REEDUCATION: CPT

## 2020-01-20 NOTE — PROGRESS NOTES
Daily Note     Today's date: 2020  Patient name: Meg Byrd  : 1940  MRN: [id number]  Referring provider: MEKA Carranza  Dx: No diagnosis found  Subjective: "I use to do these for my shoulder"  Objective: See treatment description below    UBE x 4 minutes prograde and x4 minutes retrograde in seated position bilaterally with 1 4 resistance level focusing on improved proximal strength/stability and muscle endurance  OTR educated Pt on HEP--theraband to perform in home environment-- HEP focusing on shoulder FF, shoulder ABD, horizontal ABD, and ER/IR  OTR educated Pt on the importance of performing 10 reps of each exercise for at least 3x/week  Pt performed each exercise x 10 reps for G understanding for G carryover to home environment  Assessment: Tolerated treatment well  Patient would benefit from continued OT     Pt with fair - tolerance to UBE demands with Mod fatigue reported in BUE R>L  Pt demo with G understanding of each exercise with improved carryover with verbal/visual/physical demonstration cues  Pt demo with G understanding the frequency recommended to her for improved functional performance  Plan: Continue per plan of care        INTERVENTION COMMENTS:  Diagnosis: Thalamic Stroke   Precautions: HTN, Hypothyroidism, fall risk  FOTO: 40, with 40% limitation in Hand function   Insurance: Payor: Braeden Barker / Plan: MEDICARE A AND B / Product Type: Medicare A & B Fee for Service /   1 of 10 visits, PN due 2020

## 2020-01-20 NOTE — PROGRESS NOTES
Daily Note     Today's date: 2020  Patient name: Cindy Dao  : 1940  MRN: [id number]  Referring provider: MEKA Fonseca  Dx:   Encounter Diagnosis     ICD-10-CM    1  Thalamic stroke ([facility] [address] ) I63 9        Start Time: 1528  Stop Time: 1610  Total time in clinic (min): 42 minutes    Subjective: Pt reports she hurt the sole of her right foot about an hour ago  She was in the tub and her leg was resting on the edge of the tub, and her foot slipped an hit the faucet handle  She denies any open wound, but reports bruising on the plantar aspect of her right foot  She denies pain currently but reports that it will hurt her if she walks for too long         Objective: See treatment diary below    Exercise Diary  1 1   Endurance      6MWT, 918 feet  5xSTS    Lower Extremity Strengthening 1/4 squats 10x2    Heel raise 15 x2 on incline    PPT 10x3  Hooklying core isometrics, pressing hand towards thigh 10x3 bilateral  Hooklying ball roll outs 10x3  Hooklying marches 10x2  SLR 10x2 b/l      HEP:  PPT x10  Hooking core isometrics, pressing hand to thigh x10  Bilateral isometric hip flexion x10  Hooklying shoulder flexion x10  Side lunge x10 each side  Fwd lunge x10 each foot   PPT x 10    Bilateral hip flexion hand to thigh x 10    Sit to stand on foam x 30 seconds (LE weakness)    Sit to stand lowered plinth, x 10 x 2 PPT x 10    Bilateral hip flexion hand to thigh x 10    Sit to stand on foam from 17" chair, 30 seconds x3  6,6,7, w/ 2 handrails    Sit to stands on declined wedge x6 minutes    Static Balance        Dynamic Balance Tandem walking 15 ft x8 in parallel bars  EC walking in parallel bars 15ft x8  Low obstacle step overs 20ft x8  Backwards walking 20ft x8  Cone toe taps x30  Foam STS x10 Tandem walking in parallel bars w/ foam to firm transfer 15ft x4  Sit to stand on foam 5x2 Overground walking with head turns, then trunk rotation with ball passing x 10 minutes    Tandem walk on foam, then lateral stepping on foam x 20 ft x 4 x 2    Tandem walk with eyes closed x 20 ft x 6     Walking w/ HTs 20ft x4  Walking w/ HNs 20ft x4  High marches 20ft x4  High marches w/ opposite hand tap 20ft x2  Tandem walking on foam x4 each direction   Tandem walking balance beam x3 minutes    Tandem walking on firm x 4 minutes    Forward walking cone toe taps 30ft x2  Lateral stepping cone toe taps 30ft x2       Standing runners stretch bilateral x 20 seconds x 3       Outcome Measures 12/9/2019 12/31/19 1/20   2 Minute Walk Test (ft): 350 feet 6 minute walk test, ending heart rate 72 bpm     550 feet, stopping at 4 minutes into test due to back pain and hip pain 6 minute walk test, 918 feet   10MWT (s):  13 49 seconds 12 4 seconds with cane    5x Sit To Stand (s):  20 57 seconds 15 3 seconds 17 09 seconds   TUG (s) NT 12 3 seconds    Functional Gait Assessment   19/30 1/20/2020  Functional Gait Assessment  3/3 Gait level surface  3/3 Change in gait speed  2/3 Gait with horizontal head turns  2/3 Gait with vertical head turns  3/3 Gait and pivot turn  2/3 Step over obstacle  1/3 Gait with narrow base of support  1/3 Gait with eyes closed  2/3 Ambulating backwards  2/3 Steps  21/30 Total score (less than 22/30 indicates increased risk of fall)         Assessment: Patient tolerated treatment well today  Significant improvement in 6 minute walk test today, however about 4 minutes in pt began to walk w/ slight antalgic gait, which increased as pt walked final 2 minutes  She reported left calf pain and right hip pain, pt was repeatedly offered rest breaks but she deferred this  Pt provided w/ extended rest break after 6 MWT, w/ pt reporting decreased hip and gastroc pain w/ rest  Pt demonstrates improvements w/ FGA today, she has most difficulty in narrow stance, and with her eyes closed  Remaining time in clinic focusing on these deficits   Pt would benefit from continued pt to continue to improve w/ balance and endurance  Plan: Continue per plan of care    Continue balance challenges on foam       INTERVENTION COMMENTS:  Diagnosis: Thalamic Stroke   Precautions: HTN, Hypothyroidism, fall risk  FOTO: 40, with 40% limitation in Hand function   Insurance: Payor: Janece Husbands / Plan: MEDICARE A AND B / Product Type: Medicare A & B Fee for Service /   2 of 10 visits, PN due 01/12/2020

## 2020-01-23 ENCOUNTER — OFFICE VISIT (OUTPATIENT)
Dept: PHYSICAL THERAPY | Facility: REHABILITATION | Age: 80
End: 2020-01-23
Payer: MEDICARE

## 2020-01-23 ENCOUNTER — OFFICE VISIT (OUTPATIENT)
Dept: OCCUPATIONAL THERAPY | Facility: REHABILITATION | Age: 80
End: 2020-01-23
Payer: MEDICARE

## 2020-01-23 DIAGNOSIS — I63.9 THALAMIC STROKE (HCC): Primary | ICD-10-CM

## 2020-01-23 PROCEDURE — 97112 NEUROMUSCULAR REEDUCATION: CPT

## 2020-01-23 PROCEDURE — 97110 THERAPEUTIC EXERCISES: CPT | Performed by: OCCUPATIONAL THERAPIST

## 2020-01-23 PROCEDURE — 97110 THERAPEUTIC EXERCISES: CPT

## 2020-01-23 PROCEDURE — 97112 NEUROMUSCULAR REEDUCATION: CPT | Performed by: OCCUPATIONAL THERAPIST

## 2020-01-23 NOTE — PROGRESS NOTES
Daily Note     Today's date: 2020  Patient name: Jc Zuniga  : 1940  MRN: [id number]  Referring provider: MEKA Mendez  Dx:   Encounter Diagnosis     ICD-10-CM    1  Thalamic stroke ([address] ) I63 9        Start Time: 1600  Stop Time: 1642  Total time in clinic (min): 42 minutes    Subjective: Pt reports her feet feel fine today and is agreeable to walk in clinic today         Objective: See treatment diary below    Exercise Diary    Endurance     6MWT, 918 feet  5xSTS 3 5 minutes   Seated rest  3 5 minutes  Seated rest    Lower Extremity Strengthening HEP:  PPT x10  Hooking core isometrics, pressing hand to thigh x10  Bilateral isometric hip flexion x10  Hooklying shoulder flexion x10  Side lunge x10 each side  Fwd lunge x10 each foot   PPT x 10    Bilateral hip flexion hand to thigh x 10    Sit to stand on foam x 30 seconds (LE weakness)    Sit to stand lowered plinth, x 10 x 2 PPT x 10    Bilateral hip flexion hand to thigh x 10    Sit to stand on foam from 17" chair, 30 seconds x3  6,6,7, w/ 2 handrails    Sit to stands on declined wedge x6 minutes  Sit to stand 3x3  1/4 squats 8x3  Heel raises 6x3  1/4 forward lunges x6 each  1/4 lateral lunges x6 each   Static Balance     Firm:  FTEC  x1 min    Foam:  FTEO  x1 minFTEC x1 min  Tandem x1 min each   Dynamic Balance Tandem walking in parallel bars w/ foam to firm transfer 15ft x4  Sit to stand on foam 5x2 Overground walking with head turns, then trunk rotation with ball passing x 10 minutes    Tandem walk on foam, then lateral stepping on foam x 20 ft x 4 x 2    Tandem walk with eyes closed x 20 ft x 6     Walking w/ HTs 20ft x4  Walking w/ HNs 20ft x4  High marches 20ft x4  High marches w/ opposite hand tap 20ft x2  Tandem walking on foam x4 each direction   Tandem walking balance beam x3 minutes    Tandem walking on firm x 4 minutes    Forward walking cone toe taps 30ft x2  Lateral stepping cone toe taps 30ft x2 Low and high hurdles 15ft x4  EC walking 15ftx4  Tandem walking 15ft x4      Standing runners stretch bilateral x 20 seconds x 3        Outcome Measures 12/9/2019 12/31/19 1/20   2 Minute Walk Test (ft): 350 feet 6 minute walk test, ending heart rate 72 bpm     550 feet, stopping at 4 minutes into test due to back pain and hip pain 6 minute walk test, 918 feet   10MWT (s):  13 49 seconds 12 4 seconds with cane    5x Sit To Stand (s):  20 57 seconds 15 3 seconds 17 09 seconds   TUG (s) NT 12 3 seconds    Functional Gait Assessment   19/30           Assessment: Patient tolerated treatment well today  Pt ambulation is limited to right hip pain, which she reports at about 3 minutes, and requests sitting rest shortly after  Decreased reps completed today w/ LE strengthening exercises, pt reporting fatigue from walking earlier in session  Pt maintains balance stability through most of static balance exercises  She does experience LOB w/ EC on foam, but she is able to make repeated corrections w/ hip strategy prior to experiencing LOB  Additionally she experiences instability when stepping over low and high obstacles  End of session pt is provided w/ CP for relief of reported muscle soreness  Pt would benefit from continued PT  Plan: Continue per plan of care  Pt is scheduled for re evaluation        INTERVENTION COMMENTS:  Diagnosis: Thalamic Stroke   Precautions: HTN, Hypothyroidism, fall risk  FOTO: 40, with 40% limitation in Hand function   Insurance: Payor: Nik Gonzales / Plan: MEDICARE A AND B / Product Type: Medicare A & B Fee for Service /   2 of 10 visits, PN due 01/12/2020

## 2020-01-23 NOTE — PROGRESS NOTES
Daily Note     Today's date: 2020  Patient name: Mge Byrd  : 1940  MRN: [id number]  Referring provider: MEKA Carranza  Dx: No diagnosis found  Subjective: "I want you to see how I do this exercise and tell me how to do it right "      Objective: See treatment description below    UBE x 4 minutes prograde and x 4 minutes retrograde in seated position bilaterally with 1 5 resistance level focusing on improved proximal strength/stability and muscle endurance  OTS reviewed pt's HEP to improve home carryover  Pt performed B/L UE strengthening with 2lb dumbbell to improve strength in proximal and distal muscles  Pt performed B/L in-hand manipulation with small buttons 2* to pt's difficulties with pinch and grasp FM activities at home  Pt completed Line Tangles activity in standing with sustained OH reach demands to improve RUE endurance, proximal strength and stability, and coordination  Assessment: Tolerated treatment well  Patient exhibited good technique with therapeutic exercises     Pt tolerated UBE well with minor fatigue in B/L UE  Pt required re-education on theraband HEP (shoulder IE, ER, FF, ABD) for UE strengthening with G understanding evident  Pt demo G technique in UE strengthening dumbbell exercises with no complaints of fatigue reported  Pt tolerated standing UE endurance activity well as evident by 0 rest breaks or signs of fatigue with improved line fluidity with massed practice  Plan: Continue per plan of care        INTERVENTION COMMENTS:  Diagnosis: Thalamic Stroke   Precautions: HTN, Hypothyroidism, fall risk  FOTO: 40, with 40% limitation in Hand function   Insurance: Payor: Braeden Barker / Plan: MEDICARE A AND B / Product Type: Medicare A & B Fee for Service /   3 of 10 visits, PN due 2020      Treated and documented by PENNY Hummel under direct supervision by Everton Mojica MS, OTR/L

## 2020-01-27 ENCOUNTER — EVALUATION (OUTPATIENT)
Dept: PHYSICAL THERAPY | Facility: REHABILITATION | Age: 80
End: 2020-01-27
Payer: MEDICARE

## 2020-01-27 ENCOUNTER — OFFICE VISIT (OUTPATIENT)
Dept: OCCUPATIONAL THERAPY | Facility: REHABILITATION | Age: 80
End: 2020-01-27
Payer: MEDICARE

## 2020-01-27 DIAGNOSIS — R26.9 GAIT DISORDER: ICD-10-CM

## 2020-01-27 DIAGNOSIS — I63.9 THALAMIC STROKE (HCC): Primary | ICD-10-CM

## 2020-01-27 PROCEDURE — 97530 THERAPEUTIC ACTIVITIES: CPT | Performed by: OCCUPATIONAL THERAPIST

## 2020-01-27 PROCEDURE — 97110 THERAPEUTIC EXERCISES: CPT | Performed by: OCCUPATIONAL THERAPIST

## 2020-01-27 PROCEDURE — 97112 NEUROMUSCULAR REEDUCATION: CPT

## 2020-01-27 NOTE — LETTER
2020    Sanchez Mcneill, [address] [facility] [address]    Patient: Jt Loomis   YOB: 1940   Date of Visit: 2020     Encounter Diagnosis     ICD-10-CM    1  Thalamic stroke (HCC) I63 9    2  Gait disorder R26 9        Dear Dr Alexandra Heart: Thank you for your recent referral of Jt Loomis  Please review the attached evaluation summary from Ivory's recent visit  Please verify that you agree with the plan of care by signing the attached order  If you have any questions or concerns, please do not hesitate to call  I sincerely appreciate the opportunity to share in the care of one of your patients and hope to have another opportunity to work with you in the near future  Sincerely,    Raffaele Butler, PT      Referring Provider:      I certify that I have read the below Plan of Care and certify the need for these services furnished under this plan of treatment while under my care  Sanchez Mcneill, [address] [address]  VIA Facsimile: [phone]          RE-EVALUATION / Treatment Note     Today's date: 2020  Patient name: Jt Loomis  : 1940  MRN: [id number]  Referring provider: MEKA Bone  Dx:   Encounter Diagnosis     ICD-10-CM    1  Thalamic stroke (HCC) I63 9    2  Gait disorder R26 9      Subjective:  Patient reports overall balance and gait reports improved  Denies falls  Objective: See treatment diary below  Reassessed- see below  PT verbal and tactile cues for technique, setup and progression with all ex's      Exercise Diary 19   Endurance Scifit lv 2 x3 min, rest, x2       Lower Extremity Strengthening   Sit to stand, lowered plinth, 10    Heel raises, 15 - 20 x 2 sets standing on incline     Bridge, 10  Bridge, ankle on contralateral knee, 10 each   Wall 1/4 squats 1x15    Wall heel raises 15x   Static Balance Foam:  FTEO x1 min  FTEC x2 min (3 posterior LOB)  Semitandem EO, 2 fingers on chair x1 min, 1 lateral LOB  Semitandem EC x1 min each, 4 fingers on chair  Single leg stance on foam,2 fingers on chair x1 min each Foam:  FTEO x2 min  FTEC x2 min (2 posterior LOB)  1 minute rest break  Semitandem EO,x2 min  Semitandem EC x1 min each  Foam ball toss x2 minutes, 1 posterior LOB  Review of static HEP:  FTEO/EC, semitandem EO/EC, HTs/HNs        Ball toss side by side x 2 min     ball bounce w/ gait x 1 min    Biodex maze med 42%, 48%  Biodex LOS easy 35%    Partial crossover step 2 min    Heel to toe gait F/B x 2 min parallel bars   Dynamic Balance Tandem stepping on airex foam x2 minutes, continuous LOB, requires rail  Lateral stepping on airex foam x2 minutes  Ascend/desc clinic stairwell x2 Tandem stepping on airex foam 2 min x2, requires rail use  Walking w/ HTs/HNs x2 minutes each Walking head turns, 1 min x 2     Dynamic Gait drills HT, change speed, stop/ start x 3min       Quadruped to stand, using anterior support, x 2      98%SpO2, 72 bpm  Pre session sit: /85 HR 71      Outcome Measures 12/9/2019 12/31/19 1/27/20   2 Minute Walk Test (ft): 350 feet 6 minute walk test, ending heart rate 72 bpm    550 feet, stopping at 4 minutes into test due to back pain and hip pain 6 MWT      800 feet in 5 min 15 seconds, stopped due to right hip and left calf discomfort HR 72 bpm   10MWT (s):  13 49 seconds 12 4 seconds with cane No SPC 1 1 m/second   5x Sit To Stand (s):  20 57 seconds 15 3 seconds 17 0 seconds   TUG (s) NT 12 3 seconds 9 44 seconds   Functional Gait Assessment  19/30 22/30     Functional Gait Assessment  2/3 Gait level surface  3/3 Change in gait speed  2/3 Gait with horizontal head turns  3/3 Gait with vertical head turns  3/3 Gait and pivot turn  1/3 Step over obstacle  2/3 Gait with narrow base of support  2/3 Gait with eyes closed  2/3 Ambulating backwards  2/3 Steps  22/30 Total score (less than 22/30 indicates increased risk of fall)     Gait - Ambulates independent without device, slight increased base of support, slight right antalgic-like gait  ASSESSMENT  Overall she is demonstrating great progress in her gait speed and balance on testing  Note still some weakness/ power deficits with 5xSTS testing  Recommend continue to use SPC outside of home  Recommend continue PT, anticipate meeting all long term goals within 3-4 weeks  STG's (8 weeks):   - Patient will be independent with home exercise program in 2 weeks  MET with continued focus on aerobic activity  - Patient will demonstrate a  1m/sec increase in gait speed time to demonstrate improved functional mobility and functional independence  MET   - Patient will complete 5 time sit to  no more than 15 seconds to demonstrate improved functional mobility, functional strength, and functional independence  MET   - Patient will complete 6 minute walk test to demonstrate improved functional mobility, functional independence, and functional endurance  NOT MET       LTG's (16 weeks):  - Patient will demonstrate a  1m/sec increase in gait speed time to demonstrate improved functional mobility and functional independence  - MET  - Patient will complete 5 time sit to  no more than 12 seonds seconds to demonstrate improved functional mobility, functional strength, and functional independence - NOT MET  - Patient will demonstrate at least a 10% improvement on the 6 minute walk test to improved functional mobility, functional independence, and functional endurance  - PROGRESSING  - Patient will score at least 22/30 on FGA to demonstrate improved dynamic balance, improved functional mobility, functional strength, and functional independence - MET    PLAN OF CARE  Patient will benefit from skilled outpatient physical therapy 2 x/wk for  1 months   Therapeutic interventions to include therapeutic activity, therapeutic exercise, and neuromuscular re-education as indicated in the grids

## 2020-01-27 NOTE — PROGRESS NOTES
Daily Note     Today's date: 2020  Patient name: Ebony Howell  : 1940  MRN: [id number]  Referring provider: MEKA Smith  Dx:   Encounter Diagnosis     ICD-10-CM    1  Thalamic stroke ([facility] [address] ) I63 9        Start Time: 1050  Stop Time: 1145  Total time in clinic (min): 55 minutes    Subjective: "I haven't tried painting at home yet"  Objective: See treatment description below    UBE x 5 minutes prograde and x 5 minutes retrograde in seated position bilaterally with 1 7 resistance level with focus on posterior strengthening and muscle endurance  Seated thera ex-- Pt performed ER/IR with #2 weighted ball transferring from hand to hand focusing on proximal/posterior strength, B/L coordination, and muscle endurance  Pt performed 3 sets x 10 reps  In stance functional reach crossing midline for foam block retrieval and placement on board placed at [facility] level focusing on improved RUE muscle endurance, improved sustained OH reach, and improved hand to target accuracy  In stance OH reach performing pre-handwriting activity of tracing letters and then advancing to free- hand handwriting focusing on improved fluidity/legibility of handwriting and improved shoulder girdle strength  Assessment: Tolerated treatment well  Patient would benefit from continued OT     Pt able to tolerate 10 minutes of UBE with increased resistance on this date with Max fatigue reported in BUE R>L  Pt also presented with Max fatigue after seated thera ex with #2 weighted ball requiring rest breaks needed  Pt demo with G hand to target accuracy with functional reach with 0% dysmetria evident  Pt demo with significant improved fluidity/legibility of handwriting with G motor control  OTR recommending Pt to return to perform painting and handwriting functional tasks in home environment for improved assessment of current presenting deficits  Plan: Continue per plan of care        INTERVENTION COMMENTS:  Diagnosis: Thalamic Stroke   Precautions: HTN, Hypothyroidism, fall risk  FOTO: 40, with 40% limitation in Hand function   Insurance: Payor: Kiesha Curry / Plan: MEDICARE A AND B / Product Type: Medicare A & B Fee for Service /   3 of 10 visits, PN due 01/12/2020

## 2020-01-27 NOTE — PROGRESS NOTES
RE-EVALUATION / Treatment Note     Today's date: 2020  Patient name: Maryam Smith  : 1940  MRN: [id number]  Referring provider: MEKA Denis  Dx:   Encounter Diagnosis     ICD-10-CM    1  Thalamic stroke (HCC) I63 9    2  Gait disorder R26 9      Subjective:  Patient reports overall balance and gait reports improved  Denies falls  Objective: See treatment diary below  Reassessed- see below  PT verbal and tactile cues for technique, setup and progression with all ex's      Exercise Diary 19   Endurance Scifit lv 2 x3 min, rest, x2       Lower Extremity Strengthening   Sit to stand, lowered plinth, 10    Heel raises, 15 - 20 x 2 sets standing on incline     Bridge, 10  Bridge, ankle on contralateral knee, 10 each   Wall 1/4 squats 1x15    Wall heel raises 15x   Static Balance Foam:  FTEO x1 min  FTEC x2 min (3 posterior LOB)  Semitandem EO, 2 fingers on chair x1 min, 1 lateral LOB  Semitandem EC x1 min each, 4 fingers on chair  Single leg stance on foam,2 fingers on chair x1 min each Foam:  FTEO x2 min  FTEC x2 min (2 posterior LOB)  1 minute rest break  Semitandem EO,x2 min  Semitandem EC x1 min each  Foam ball toss x2 minutes, 1 posterior LOB  Review of static HEP:  FTEO/EC, semitandem EO/EC, HTs/HNs        Ball toss side by side x 2 min     ball bounce w/ gait x 1 min    Biodex maze med 42%, 48%  Biodex LOS easy 35%    Partial crossover step 2 min    Heel to toe gait F/B x 2 min parallel bars   Dynamic Balance Tandem stepping on airex foam x2 minutes, continuous LOB, requires rail  Lateral stepping on airex foam x2 minutes  Ascend/desc clinic stairwell x2 Tandem stepping on airex foam 2 min x2, requires rail use  Walking w/ HTs/HNs x2 minutes each Walking head turns, 1 min x 2     Dynamic Gait drills HT, change speed, stop/ start x 3min       Quadruped to stand, using anterior support, x 2      98%SpO2, 72 bpm  Pre session sit: /85 HR 71      Outcome Measures 12/9/2019 12/31/19 1/27/20   2 Minute Walk Test (ft): 350 feet 6 minute walk test, ending heart rate 72 bpm    550 feet, stopping at 4 minutes into test due to back pain and hip pain 6 MWT      800 feet in 5 min 15 seconds, stopped due to right hip and left calf discomfort HR 72 bpm   10MWT (s):  13 49 seconds 12 4 seconds with cane No SPC 1 1 m/second   5x Sit To Stand (s):  20 57 seconds 15 3 seconds 17 0 seconds   TUG (s) NT 12 3 seconds 9 44 seconds   Functional Gait Assessment  19/30 22/30     Functional Gait Assessment  2/3 Gait level surface  3/3 Change in gait speed  2/3 Gait with horizontal head turns  3/3 Gait with vertical head turns  3/3 Gait and pivot turn  1/3 Step over obstacle  2/3 Gait with narrow base of support  2/3 Gait with eyes closed  2/3 Ambulating backwards  2/3 Steps  22/30 Total score (less than 22/30 indicates increased risk of fall)       Gait - Ambulates independent without device, slight increased base of support, slight right antalgic-like gait  ASSESSMENT  Overall she is demonstrating great progress in her gait speed and balance on testing  Note still some weakness/ power deficits with 5xSTS testing  Recommend continue to use SPC outside of home  Recommend continue PT, anticipate meeting all long term goals within 3-4 weeks  STG's (8 weeks):   - Patient will be independent with home exercise program in 2 weeks  MET with continued focus on aerobic activity  - Patient will demonstrate a  1m/sec increase in gait speed time to demonstrate improved functional mobility and functional independence  MET   - Patient will complete 5 time sit to  no more than 15 seconds to demonstrate improved functional mobility, functional strength, and functional independence  MET   - Patient will complete 6 minute walk test to demonstrate improved functional mobility, functional independence, and functional endurance     NOT MET       LTG's (16 weeks):  - Patient will demonstrate a  1m/sec increase in gait speed time to demonstrate improved functional mobility and functional independence  - MET  - Patient will complete 5 time sit to  no more than 12 seonds seconds to demonstrate improved functional mobility, functional strength, and functional independence - NOT MET  - Patient will demonstrate at least a 10% improvement on the 6 minute walk test to improved functional mobility, functional independence, and functional endurance  - PROGRESSING  - Patient will score at least 22/30 on FGA to demonstrate improved dynamic balance, improved functional mobility, functional strength, and functional independence - MET    PLAN OF CARE  Patient will benefit from skilled outpatient physical therapy 2 x/wk for 1 months  Therapeutic interventions to include therapeutic activity, therapeutic exercise, and neuromuscular re-education as indicated in the grids

## 2020-01-30 ENCOUNTER — OFFICE VISIT (OUTPATIENT)
Dept: OCCUPATIONAL THERAPY | Facility: REHABILITATION | Age: 80
End: 2020-01-30
Payer: MEDICARE

## 2020-01-30 ENCOUNTER — OFFICE VISIT (OUTPATIENT)
Dept: PHYSICAL THERAPY | Facility: REHABILITATION | Age: 80
End: 2020-01-30
Payer: MEDICARE

## 2020-01-30 DIAGNOSIS — I63.9 THALAMIC STROKE (HCC): Primary | ICD-10-CM

## 2020-01-30 DIAGNOSIS — R26.9 GAIT DISORDER: ICD-10-CM

## 2020-01-30 PROCEDURE — 97530 THERAPEUTIC ACTIVITIES: CPT | Performed by: OCCUPATIONAL THERAPIST

## 2020-01-30 PROCEDURE — 97110 THERAPEUTIC EXERCISES: CPT

## 2020-01-30 PROCEDURE — 97112 NEUROMUSCULAR REEDUCATION: CPT

## 2020-01-30 PROCEDURE — 97110 THERAPEUTIC EXERCISES: CPT | Performed by: OCCUPATIONAL THERAPIST

## 2020-01-30 NOTE — PROGRESS NOTES
Daily Note     Today's date: 2020  Patient name: Maryam Smith  : 1940  MRN: [id number]  Referring provider: MEKA Denis  Dx:   Encounter Diagnosis     ICD-10-CM    1  Thalamic stroke (HCC) I63 9                   Subjective: "This is the most productive time of my day "      Objective: See treatment description below    Pt completed UBE x 5 min prograde and x 5 min retrograde bilaterally in seated position with 1 7 resistance to improve R>L muscle endurance and proximal muscle strength  3 x 10 reps of mixed motor patterns with 2lb weighted dumbbell for preparatory activity of strengthening RUE  Functional OH reach activity for peg retrieval and placement in peg board and resistive clothes pin to power web with RUE to improve muscle endurance with OH functional tasks  Water paint by number picture completed in standing with forward functional reach to elevated wedge focusing on improved muscle endurance/proximal strength for eventual return to leisure activity of painting in home environment  Assessment: Tolerated treatment well  Patient would benefit from continued OT    Pt demo max fatigue in RUE after UBE and preparatory task with hand weights  Pt sustained RUE OH reach x 20 minutes and sustained forward functional reach with painting activity x 20 minutes in standing with minimal fatigue and 0 rest breaks required  Pt presented with excellent motor control for painting activity with 0 occasions of dysmetria or painting outside of provided lines  OTR recommending Pt to return to painting activities in home environment  Plan: Continue per plan of care        INTERVENTION COMMENTS:  Diagnosis: Thalamic Stroke   Precautions: HTN, Hypothyroidism, fall risk  FOTO: 40, with 40% limitation in Hand function   Insurance: Payor: Nina Salinas / Plan: MEDICARE A AND B / Product Type: Medicare A & B Fee for Service /   5 of 10 visits, PN due 2020        Written and treated by Danielito Toussaint PENNY Keane under direct supervision by Prasad Jarrett, MS, OTR/L

## 2020-01-30 NOTE — PROGRESS NOTES
Daily Note     Today's date: 2020  Patient name: Iveth Bennett  : 1940  MRN: [id number]  Referring provider: MEKA Kennedy  Dx:   Encounter Diagnosis     ICD-10-CM    1  Thalamic stroke (HCC) I63 9    2  Gait disorder R26 9        Start Time: 1536  Stop Time: 1616  Total time in clinic (min): 40 minutes    Subjective: Pt denies current c/o pain  Objective: See treatment diary below      Exercise Diary 19   Endurance    Treadmill, 1 0 incline, 0 6 mph x3 minutes (right hip pain)  Scifit lv 1 x6 minutes    Lower Extremity Strengthening  Sit to stand, lowered plinth, 10    Heel raises, 15 - 20 x 2 sets standing on incline     Bridge, 10  Bridge, ankle on contralateral knee, 10 each   Wall 1/4 squats 1x15    Wall heel raises 15x Wall 1/4 squats 10x3    Wall heel raises 15x    Sit to stand form 19" surface 10x3   Static Balance Foam:  FTEO x2 min  FTEC x2 min (2 posterior LOB)  1 minute rest break  Semitandem EO,x2 min  Semitandem EC x1 min each  Foam ball toss x2 minutes, 1 posterior LOB  Review of static HEP:  FTEO/EC, semitandem EO/EC, HTs/HNs        Ball toss side by side x 2 min     ball bounce w/ gait x 1 min    Biodex maze med 42%, 48%  Biodex LOS easy 35%    Partial crossover step 2 min    Heel to toe gait F/B x 2 min parallel bars Foam:  HTs x20  FAEC x30 seconds   Dynamic Balance Tandem stepping on airex foam 2 min x2, requires rail use  Walking w/ HTs/HNs x2 minutes each Walking head turns, 1 min x 2     Dynamic Gait drills HT, change speed, stop/ start x 3min Ambulation w/ sudden stops, turns, speed change x4 minutes    Walking w/ self ball toss x4 laps in clinic      Quadruped to stand, using anterior support, x 2         Assessment: Tolerated treatment fair  Increased progression of LE strengthening exercises, pt reports fatigue but completes correctly  With TM she reports onset of hip pain, deferred this due to hip pain   Pt has moderate sway w/ exercises on foam, but no LOB  However w/ dynamic exercises she will repeatedly lose her balance w/ sudden stops, requiring close supervision to contact guard when this occurs  Pt would benefit from continued PT for improvements in endurance, LE strengthening, and balance  Cold packs provided to pt for quad soreness at end of session  Patient demonstrated fatigue post treatment and would benefit from continued PT      Plan: Continue per plan of care  Progress LE strengthening to tolerance, continue to challenge pt balance on foam and w/ sudden stops      INTERVENTION COMMENTS:  Diagnosis: Thalamic Stroke   Precautions: HTN, Hypothyroidism, fall risk  FOTO: 40, with 40% limitation in Hand function   Insurance: Payor: Faviola Ford / Plan: MEDICARE A AND B / Product Type: Medicare A & B Fee for Service /   3 of 10 visits, PN due 01/12/2020

## 2020-01-31 ENCOUNTER — TRANSCRIBE ORDERS (OUTPATIENT)
Dept: PHYSICAL THERAPY | Facility: REHABILITATION | Age: 80
End: 2020-01-31

## 2020-01-31 DIAGNOSIS — I63.9 IMPENDING CEREBROVASCULAR ACCIDENT (HCC): Primary | ICD-10-CM

## 2020-02-03 ENCOUNTER — OFFICE VISIT (OUTPATIENT)
Dept: PHYSICAL THERAPY | Facility: REHABILITATION | Age: 80
End: 2020-02-03
Payer: MEDICARE

## 2020-02-03 DIAGNOSIS — R26.9 GAIT DISORDER: ICD-10-CM

## 2020-02-03 DIAGNOSIS — I63.9 THALAMIC STROKE (HCC): Primary | ICD-10-CM

## 2020-02-03 PROCEDURE — 97110 THERAPEUTIC EXERCISES: CPT

## 2020-02-03 PROCEDURE — 97112 NEUROMUSCULAR REEDUCATION: CPT

## 2020-02-03 NOTE — PROGRESS NOTES
Daily Note     Today's date: 2/3/2020  Patient name: Tonita Schlatter  : 1940  MRN: [id number]  Referring provider: MEKA Baptiste  Dx:   Encounter Diagnosis     ICD-10-CM    1  Thalamic stroke (HCC) I63 9    2  Gait disorder R26 9        Start Time: 1330  Stop Time: 1410  Total time in clinic (min): 40 minutes    Subjective: Pt reports she awoke on Friday and felt dizzy and she's had that sensation since then  Upon further questioning she is unable to further describe this sensation, other than "dizzy"  She reports she took medication to lower her BP this morning         Objective: See treatment diary below    Pre: Seated 173/86, 68 bpm  Standing 149/76, 76 bpm    Post: after 10 minute rest at end  145/83, 65 bpm    Exercise Diary 19 2/3   Endurance   Treadmill, 1 0 incline, 0 6 mph x3 minutes (right hip pain)  Scifit lv 1 x6 minutes Scifit lv 1 4 minutes x2    Post: 180/88, 75 bpm    Lower Extremity Strengthening Sit to stand, lowered plinth, 10    Heel raises, 15 - 20 x 2 sets standing on incline     Bridge, 10  Bridge, ankle on contralateral knee, 10 each   Wall 1/4 squats 1x15    Wall heel raises 15x Wall 1/4 squats 10x3    Wall heel raises 15x    Sit to stand form 19" surface 10x3 Wall 1/4 squats 10x3    Wall heel raises x20    Sit to stand from 19" surface 10x3   Static Balance  Ball toss side by side x 2 min     ball bounce w/ gait x 1 min    Biodex maze med 42%, 48%  Biodex LOS easy 35%    Partial crossover step 2 min    Heel to toe gait F/B x 2 min parallel bars Foam:  HTs x20  FAEC x30 seconds    Dynamic Balance Walking head turns, 1 min x 2     Dynamic Gait drills HT, change speed, stop/ start x 3min Ambulation w/ sudden stops, turns, speed change x4 minutes    Walking w/ self ball toss x4 laps in clinic Ambulation w/ sudden stops, turns, speed change x4 minutes    FAEC x1 minute on foam    Walking w/ bounce pass x2 minutes    Walking with upper trunk rotation ball pass x2 minutes     Quadruped to stand, using anterior support, x 2          Assessment: Tolerated treatment fair  Due to report of dizziness, monitored pt BP  Initially this value was measured in seated and was elevated, as noted above, however this reduced after second reading completed in standing  Improved balance responses noted today w/ sudden stops and body turns, pt is able to stop quickly w/out LOB  However she reports increased dizziness w/ both HTs and HNs  Patient demonstrated fatigue post treatment and would benefit from continued PT      Plan: Continue per plan of care  Continue to progress LE strengthening to tolerance  Continue balance progression of HTs and body turns to tolerance      INTERVENTION COMMENTS:  Diagnosis: Thalamic Stroke   Precautions: HTN, Hypothyroidism, fall risk  FOTO: 40, with 40% limitation in Hand function   Insurance: Payor: Stuart Bar / Plan: MEDICARE A AND B / Product Type: Medicare A & B Fee for Service /   3 of 10 visits, PN due 01/12/2020

## 2020-02-06 ENCOUNTER — OFFICE VISIT (OUTPATIENT)
Dept: OCCUPATIONAL THERAPY | Facility: REHABILITATION | Age: 80
End: 2020-02-06
Payer: MEDICARE

## 2020-02-06 ENCOUNTER — OFFICE VISIT (OUTPATIENT)
Dept: PHYSICAL THERAPY | Facility: REHABILITATION | Age: 80
End: 2020-02-06
Payer: MEDICARE

## 2020-02-06 DIAGNOSIS — R26.9 GAIT DISORDER: ICD-10-CM

## 2020-02-06 DIAGNOSIS — I63.9 THALAMIC STROKE (HCC): Primary | ICD-10-CM

## 2020-02-06 PROCEDURE — 97112 NEUROMUSCULAR REEDUCATION: CPT

## 2020-02-06 PROCEDURE — 97535 SELF CARE MNGMENT TRAINING: CPT

## 2020-02-06 PROCEDURE — 97530 THERAPEUTIC ACTIVITIES: CPT

## 2020-02-06 PROCEDURE — 97110 THERAPEUTIC EXERCISES: CPT

## 2020-02-06 NOTE — PROGRESS NOTES
Daily Note     Today's date: 2020  Patient name: Meg Byrd  : 1940  MRN: [id number]  Referring provider: MEKA Carranza  Dx:   Encounter Diagnosis   Name Primary?  Thalamic stroke (HCC) Yes                  Subjective: "This triceps one is hard!"      Objective: See treatment below  Pt arrived to session c/o confusion regarding how to perform some of the UB exercises using hand weights that were provided in a previous OT session as a HEP  NAVA re-educated pt on all exercises, providing therapist demonstration and [place] A when needed, priti for elbow ext OH, for proprioceptive awareness and technique  Pt completed [id number] each exercise using 2# dumbbells seated EOM and in stance, for improved carryover and UB strengthening  UEB bilaterally while seated, 4min prograde, 4min retrograde against decreased 1 5 resistance 2* pt c/o increased proximal arm fatigue today after completing HEP exercises  Focus was on increasing R>L proximal strength/endurance  Pt engaged in bug-finding worksheet placed OH while pt in stance, for B/L sustained OH reach, proximal strengthening/endurance        Assessment: Tolerated treatment well  Pt reported difficulty with BUE proprioceptive awareness in space when arms positioned OH, NAVA suggested performing these exercises at home in front of a mirror for visual feedback/improved proprioceptive awareness  Pt c/o proximal BUE fatigue with sustained OH reach during bug task  Pt c/o B/L eyes watering during bug finding task  Plan: Continue skilled OT per POC        INTERVENTION COMMENTS:  Diagnosis: Thalamic stroke ([facility] [address] ) [I63 9]  Precautions: HTN, Hypothyroidism, fall risk  FOTO: 40, with 40% limitation in Hand function   Insurance: Payor: MEDICARE / Plan: MEDICARE A AND B / Product Type: Medicare A & B Fee for Service /   6 of 10 visits, PN due 2020

## 2020-02-06 NOTE — PROGRESS NOTES
Daily Note     Today's date: 2020  Patient name: Coral Jimenez  : 1940  MRN: [id number]  Referring provider: MEKA Neri  Dx:   Encounter Diagnosis     ICD-10-CM    1  Thalamic stroke (HCC) I63 9    2  Gait disorder R26 9        Start Time: 1045  Stop Time: 1125  Total time in clinic (min): 40 minutes    Subjective: Pt reports her eyes are teary today because she was near a vent in the clinic and this set off her allergies  Objective: See treatment diary below      Exercise Diary 20 2/3 2/6   Endurance  Treadmill, 1 0 incline, 0 6 mph x3 minutes (right hip pain)  Scifit lv 1 x6 minutes Scifit lv 1 4 minutes x2    Post: 180/88, 75 bpm Scifit lv 1 5 minutes x2    Lower Extremity Strengthening Wall 1/4 squats 1x15    Wall heel raises 15x Wall 1/4 squats 10x3    Wall heel raises 15x    Sit to stand form 19" surface 10x3 Wall 1/4 squats 10x3    Wall heel raises x20    Sit to stand from 19" surface 10x3 Wall 1/4 squats 10x3    Wall heel raises x30    Sit to stand from 19" surface 10x3    Ascending/descending clinic stairwell x2   Static Balance Ball toss side by side x 2 min     ball bounce w/ gait x 1 min    Biodex maze med 42%, 48%  Biodex LOS easy 35%    Partial crossover step 2 min    Heel to toe gait F/B x 2 min parallel bars Foam:  HTs x20  FAEC x30 seconds     Dynamic Balance Dynamic Gait drills HT, change speed, stop/ start x 3min Ambulation w/ sudden stops, turns, speed change x4 minutes    Walking w/ self ball toss x4 laps in clinic Ambulation w/ sudden stops, turns, speed change x4 minutes    FAEC x1 minute on foam    Walking w/ bounce pass x2 minutes    Walking with upper trunk rotation ball pass x2 minutes Ambulation HTs and HNs 25ft x8    High knees w/ opposite hand touch 25ft x4    Cone toe taps x15  Cone double toe taps x15               Assessment: Tolerated treatment well  Increased repetitions w/ LE exercises today, as well as added stairway exercise   Pt reports high level of fatigue w/ this  Slight lateral deviation continues to be observed w/ HTs  With activities involving single leg stance pt corrects instability w/ hip strategy  Patient demonstrated fatigue post treatment and would benefit from continued PT      Plan: Continue per plan of care  Continue to progress LE strengthening to tolerance, add foam to single leg stance balance exercises      INTERVENTION COMMENTS:  Diagnosis: Thalamic Stroke   Precautions: HTN, Hypothyroidism, fall risk  FOTO: 40, with 40% limitation in Hand function   Insurance: Payor: Faviola Ford / Plan: MEDICARE A AND B / Product Type: Medicare A & B Fee for Service /   3 of 10 visits, PN due 01/12/2020

## 2020-02-10 ENCOUNTER — EVALUATION (OUTPATIENT)
Dept: OCCUPATIONAL THERAPY | Facility: REHABILITATION | Age: 80
End: 2020-02-10
Payer: MEDICARE

## 2020-02-10 ENCOUNTER — OFFICE VISIT (OUTPATIENT)
Dept: PHYSICAL THERAPY | Facility: REHABILITATION | Age: 80
End: 2020-02-10
Payer: MEDICARE

## 2020-02-10 DIAGNOSIS — I63.9 THALAMIC STROKE (HCC): Primary | ICD-10-CM

## 2020-02-10 DIAGNOSIS — R26.9 GAIT DISORDER: ICD-10-CM

## 2020-02-10 PROCEDURE — 97112 NEUROMUSCULAR REEDUCATION: CPT

## 2020-02-10 PROCEDURE — 97110 THERAPEUTIC EXERCISES: CPT

## 2020-02-10 PROCEDURE — 97112 NEUROMUSCULAR REEDUCATION: CPT | Performed by: OCCUPATIONAL THERAPIST

## 2020-02-10 NOTE — LETTER
February 11, 2020    [facility] [address]bar [address]    Patient: Reno Burr   YOB: 1940   Date of Visit: 2/10/2020     Encounter Diagnosis     ICD-10-CM    1  Thalamic stroke [facility]) I63 9        Dear Dr Betito Gould: Thank you for your recent referral of Reno Burr  Please review the attached evaluation summary from Ivory's recent visit  Please verify that you agree with the plan of care by signing the attached order  If you have any questions or concerns, please do not hesitate to call  I sincerely appreciate the opportunity to share in the care of one of your patients and hope to have another opportunity to work with you in the near future  Sincerely,    Harsha Rodriguez, OT      Referring Provider:     I certify that I have read the below Plan of Care and certify the need for these services furnished under this plan of treatment while under my care  [facility] [address] [address]  VIA Facsimile: [phone]          OCCUPATIONAL THERAPY DISCHARGE SUMMARY:    02/10/2020  Reno Burr  1940  [id number]  [facility], [address]  1  Thalamic stroke (HCC)        Subjective Evaluation    Quality of life: good          "I think the strengthening is working well for me"  PATIENT GOAL: "I want to get back to where I was before my stroke "    HISTORY OF PRESENT ILLNESS:     Pt is a [de-identified] y o  female who was referred to Occupational Therapy s/p Thalamic Stroke  Pt presented to [facility] on 11/11/2019 with complaint of right sided paresthesia  The symptoms waxed and waned one day prior to ED evaluation  Reports missing several doses of her BP meds at home  MRI with evidence of thalamic stroke  She was followed by Neurology during her stay and received 3 days of aspirin and was started on clopidogrel  Atorvastatin was also started   A 2D echo with enlarged left atrium, and Cardiology consulted for placement of loop recorder  She underwent loop recorder insertion on discharge day and is feeling well  She is going to follow up with PT/OT as an outpatient  She will also follow up with Cardiology and Neurology as an outpatient  PT referred Pt for OT services 2* complaints of R side weakness and changes in handwriting  Pt reports continuing to notice R sided weakness, altered sensation throughout RUE, stiffness with IR AROM, and decreased fluidity/legibility of handwriting  Pt lives in a two story home independently  Pt currently performing all ADLs at independent status, though demo with Min A with IADLs  Pt is a retired  at multiple locations  Pt loss role of  at this time since CVA and also since no car longer owned  PMH: No past medical history on file  Pain Levels:     Restin    With Activity:  0    Objective     Functional Assessment        Comments  See impairment section for further details  Impairment Observations:  1  R hand weakness  2  Decreased fluidity/legibility of handwriting  3  Impaired R proprioception  4  Altered sensation throughout RUE    Dynamometer Position #2:   R: 46    58  60  L: 50    55  56    R hand dominant  Action:  3 point pinch: R 11 5 and L 11    R 12 5, L 10 5  Marck Baize Marck Baize R 13, L 10 5  2 point pinch: R 6 and L 7  R 8, L 7  R 7 5, L 6 5  Lateral pinch: R 11 and L 11    R 13, L 11    R 14, L 12 5    9-hole Peg Test: RUE: 18 94 seconds, LUE: 20 96 seconds    R 17 83, L 20 34    R 17 84 seconds, L 20 39 seconds    Functional Dexterity Test:  R 29 71 seconds, L 37 26 seconds    R 22 60 seconds, L 36 13 seconds     Myofilaments: B/L 2 83, no difficulties with distinguishing between hot or cold temperatures    B/L AROM:  Shoulder elevation: B/L full  Shoulder FF: B/L full  Shoulder ABD: B/L full  ER/IR: B/L full  Elbow ext/flex: B/L full  Sup/Pron: B/L full  Wrist flex/ext: B/L full  Composite: B/L full   Hook: B/L full  Opposition: B/L intact  Finger to nose: impaired R proprioception, L intact    Resolved, R intact  Dysdiadochokinesia: B/L intact    MMT: R 4+/5 throughout, L 4+/5 throughout    B/L 5/5 throughout      Assessment  Assessment details: See skilled analysis for further details  Skilled Analysis:  Pt is a [de-identified] y o  female referred to Occupational Therapy s/p Thalamic Stroke  Pt presents with R hand weakness, decreased fluidity/legibility of handwriting, impaired R proprioception, and altered sensation throughout RUE affecting functional use and engagement in meaningful occupations/IADLs  Pt will benefit from skilled Occupational Therapy services 2x/week for 6-8 weeks with focus on self-care management, neuro re-ed, manual tx, and sensory re-ed to improve on the above deficits, improve functional use of RUE, maximize highest level of independence, and enhance overall QOL  Pt demo with G functional progression towards goals in POC evident by improved R /pinch strength, proximal strength and muscle endurance, FMC/prehension, B/L coordination, and in-hand manipulation  OTR will be D/Cing Pt at this time 2* all goals met and excellent functional progression towards goals in POC  OTR educated Pt on the importance of continuing HEP to maintain functional progression and achievements in therapy  Pt demo G understanding and in agreement to recommendations and further plan        Short Term Goals:  - Pt will increase R prehension patterns for improved tripod with utensil management and dynamic tripod grasp with writing utensil with <5% droppage and 70% improved fluidity/legibility 4 weeks-- MET  - Pt will increase proprioception of RUE hand to target x 50% for improved functional reach vision occluded with ADL/IADL tasks 4 weeks-- MET  - Pt will demo with G carryover of Home Exercise Program to improve functional progression towards goals in Plan of care and for improved functional use of RUE 4 weeks-- MET  - Pt will increase automaticity of R UE to 75% for improved grasp release of tabletop items for improved functional performance with ADLs/IADLs/salient tasks 4 weeks-- MET  - Pt will increase rate of manipulation for all R FM tests x 70% for improved functional performance with salient tasks 4 weeks-- MET  - Pt will increase R UE to Mod I with <10% cuing for tabletop tasks for improved functional performance of life roles and salient tasks 4 weeks-- MET      Long Term Goals:  - Pt will increase automaticity of  R UE to 95% for resumption of B integrative tasks-- MET  - Pt will increase R prehension patterns for improved tripod with utensil management and dynamic tripod grasp with writing utensil with <0% droppage and 90% improved fluidity/legibility-- MET   - Pt will increase proprioception of RUE hand to target x 85% for improved functional reach vision occluded with ADL/IADL tasks-- MET  - Pt will increase automaticity of R UE to 90% for improved grasp release of tabletop items for improved functional performance with ADLs/IADLs/salient tasks-- MET  - Pt will increase rate of manipulation for all R FM tests x 90% for improved functional performance with salient tasks-- MET  - Pt will increase R UE to independent status with 0% cuing for tabletop tasks for improved functional performance of life roles and salient tasks--  MET    INTERVENTION COMMENTS:  Diagnosis: Thalamic Stroke   Precautions: HTN, Hypothyroidism, fall risk  FOTO: 40, with 40% limitation in Hand function   , 40, with 20% limitation in Hand function  Insurance: Payor: MEDICARE / Plan: MEDICARE A AND B / Product Type: Medicare A & B Fee for Service /   7 of 10 visits

## 2020-02-10 NOTE — PROGRESS NOTES
OCCUPATIONAL THERAPY DISCHARGE SUMMARY:    02/10/2020  Jt Loomis  1940  [id number]  [facility] Bone  1  Thalamic stroke (HCC)        Subjective Evaluation    Quality of life: good          "I think the strengthening is working well for me"  PATIENT GOAL: "I want to get back to where I was before my stroke "    HISTORY OF PRESENT ILLNESS:     Pt is a [de-identified] y o  female who was referred to Occupational Therapy s/p Thalamic Stroke  Pt presented to [facility] on 2019 with complaint of right sided paresthesia  The symptoms waxed and waned one day prior to ED evaluation  Reports missing several doses of her BP meds at home  MRI with evidence of thalamic stroke  She was followed by Neurology during her stay and received 3 days of aspirin and was started on clopidogrel  Atorvastatin was also started  A 2D echo with enlarged left atrium, and Cardiology consulted for placement of loop recorder  She underwent loop recorder insertion on discharge day and is feeling well  She is going to follow up with PT/OT as an outpatient  She will also follow up with Cardiology and Neurology as an outpatient  PT referred Pt for OT services 2* complaints of R side weakness and changes in handwriting  Pt reports continuing to notice R sided weakness, altered sensation throughout RUE, stiffness with IR AROM, and decreased fluidity/legibility of handwriting  Pt lives in a two story home independently  Pt currently performing all ADLs at independent status, though demo with Min A with IADLs  Pt is a retired  at multiple locations  Pt loss role of  at this time since CVA and also since no car longer owned  PMH: No past medical history on file  Pain Levels:     Restin    With Activity:  0    Objective     Functional Assessment        Comments  See impairment section for further details  Impairment Observations:  1  R hand weakness  2   Decreased fluidity/legibility of handwriting  3  Impaired R proprioception  4  Altered sensation throughout RUE    Dynamometer Position #2:   R: 46    58  60  L: 50    55  56    R hand dominant  Action:  3 point pinch: R 11 5 and L 11    R 12 5, L 10 5  Darletta Pac Darletta Pac R 13, L 10 5  2 point pinch: R 6 and L 7  R 8, L 7  R 7 5, L 6 5  Lateral pinch: R 11 and L 11    R 13, L 11    R 14, L 12 5    9-hole Peg Test: RUE: 18 94 seconds, LUE: 20 96 seconds    R 17 83, L 20 34    R 17 84 seconds, L 20 39 seconds    Functional Dexterity Test:  R 29 71 seconds, L 37 26 seconds    R 22 60 seconds, L 36 13 seconds     Myofilaments: B/L 2 83, no difficulties with distinguishing between hot or cold temperatures    B/L AROM:  Shoulder elevation: B/L full  Shoulder FF: B/L full  Shoulder ABD: B/L full  ER/IR: B/L full  Elbow ext/flex: B/L full  Sup/Pron: B/L full  Wrist flex/ext: B/L full  Composite: B/L full   Hook: B/L full  Opposition: B/L intact  Finger to nose: impaired R proprioception, L intact    Resolved, R intact  Dysdiadochokinesia: B/L intact    MMT: R 4+/5 throughout, L 4+/5 throughout    B/L 5/5 throughout      Assessment  Assessment details: See skilled analysis for further details  Skilled Analysis:  Pt is a [de-identified] y o  female referred to Occupational Therapy s/p Thalamic Stroke  Pt presents with R hand weakness, decreased fluidity/legibility of handwriting, impaired R proprioception, and altered sensation throughout RUE affecting functional use and engagement in meaningful occupations/IADLs  Pt will benefit from skilled Occupational Therapy services 2x/week for 6-8 weeks with focus on self-care management, neuro re-ed, manual tx, and sensory re-ed to improve on the above deficits, improve functional use of RUE, maximize highest level of independence, and enhance overall QOL       Pt demo with G functional progression towards goals in POC evident by improved R /pinch strength, proximal strength and muscle endurance, FMC/prehension, B/L coordination, and in-hand manipulation  OTR will be D/Cing Pt at this time 2* all goals met and excellent functional progression towards goals in POC  OTR educated Pt on the importance of continuing HEP to maintain functional progression and achievements in therapy  Pt demo G understanding and in agreement to recommendations and further plan        Short Term Goals:  - Pt will increase R prehension patterns for improved tripod with utensil management and dynamic tripod grasp with writing utensil with <5% droppage and 70% improved fluidity/legibility 4 weeks-- MET  - Pt will increase proprioception of RUE hand to target x 50% for improved functional reach vision occluded with ADL/IADL tasks 4 weeks-- MET  - Pt will demo with G carryover of Home Exercise Program to improve functional progression towards goals in Plan of care and for improved functional use of RUE 4 weeks-- MET  - Pt will increase automaticity of R UE to 75% for improved grasp release of tabletop items for improved functional performance with ADLs/IADLs/salient tasks 4 weeks-- MET  - Pt will increase rate of manipulation for all R FM tests x 70% for improved functional performance with salient tasks 4 weeks-- MET  - Pt will increase R UE to Mod I with <10% cuing for tabletop tasks for improved functional performance of life roles and salient tasks 4 weeks-- MET      Long Term Goals:  - Pt will increase automaticity of  R UE to 95% for resumption of B integrative tasks-- MET  - Pt will increase R prehension patterns for improved tripod with utensil management and dynamic tripod grasp with writing utensil with <0% droppage and 90% improved fluidity/legibility-- MET   - Pt will increase proprioception of RUE hand to target x 85% for improved functional reach vision occluded with ADL/IADL tasks-- MET  - Pt will increase automaticity of R UE to 90% for improved grasp release of tabletop items for improved functional performance with ADLs/IADLs/salient tasks-- MET  - Pt will increase rate of manipulation for all R FM tests x 90% for improved functional performance with salient tasks-- MET  - Pt will increase R UE to independent status with 0% cuing for tabletop tasks for improved functional performance of life roles and salient tasks--  MET    INTERVENTION COMMENTS:  Diagnosis: Thalamic Stroke   Precautions: HTN, Hypothyroidism, fall risk  FOTO: 40, with 40% limitation in Hand function   , 40, with 20% limitation in Hand function  Insurance: Payor: MEDICARE / Plan: MEDICARE A AND B / Product Type: Medicare A & B Fee for Service /   7 of 10 visits

## 2020-02-10 NOTE — PROGRESS NOTES
Daily Note     Today's date: 2/10/2020  Patient name: Nani Canales  : 1940  MRN: [id number]  Referring provider: MEKA Traylor  Dx:   Encounter Diagnosis     ICD-10-CM    1  Thalamic stroke (HCC) I63 9    2  Gait disorder R26 9        Start Time: 1333  Stop Time: 1413  Total time in clinic (min): 40 minutes    Subjective: Pt reports her head is a little "fuzzy", she attributes this to the weather  She reports she is interested in the fitness program        Objective: See treatment diary below      Exercise Diary  2/3 2/6 2/10   Endurance Treadmill, 1 0 incline, 0 6 mph x3 minutes (right hip pain)  Scifit lv 1 x6 minutes Scifit lv 1 4 minutes x2    Post: 180/88, 75 bpm Scifit lv 1 5 minutes x2     Lower Extremity Strengthening Wall 1/4 squats 10x3    Wall heel raises 15x    Sit to stand form 19" surface 10x3 Wall 1/4 squats 10x3    Wall heel raises x20    Sit to stand from 19" surface 10x3 Wall 1/4 squats 10x3    Wall heel raises x30    Sit to stand from 19" surface 10x3    Ascending/descending clinic stairwell x2 Wall 1/4 squats 10x3 w/ kickball on back    Wall heel raises x30    Sit to stand from 17" surface 3x5    Ascending/descending clinic stairwell x2    Lateral stepping w/ pink Tband 15ft x4   Static Balance Foam:  HTs x20  FAEC x30 seconds   Low kishor step over floor toe taps x15 each  Cone taps x15 each  Foam cone toe taps x15 each   Dynamic Balance Ambulation w/ sudden stops, turns, speed change x4 minutes    Walking w/ self ball toss x4 laps in clinic Ambulation w/ sudden stops, turns, speed change x4 minutes    FAEC x1 minute on foam    Walking w/ bounce pass x2 minutes    Walking with upper trunk rotation ball pass x2 minutes Ambulation HTs and HNs 25ft x8    High knees w/ opposite hand touch 25ft x4    Cone toe taps x15  Cone double toe taps x15 Foam balance beam x4               Assessment: Tolerated treatment well   Pt continues to progress in LE strengthening, she has progressed to STS from lower surface, however she fatigues very rapidly and can only tolerate 3 repetitions before she requires a rest break  Pt continues to have difficulty in single leg stance, however she reports high level of fatigue, and her balance errors increase as her fatigue increases  Pt relies primarily on hip and stepping strategy w/ the majority of balance exercises  Patient demonstrated fatigue post treatment and would benefit from continued PT      Plan: Continue per plan of care  Continue to progress LE strengthening to tolerance, continue foam balance challenges      INTERVENTION COMMENTS:  Diagnosis: Thalamic Stroke   Precautions: HTN, Hypothyroidism, fall risk  FOTO: 40, with 40% limitation in Hand function   Insurance: Payor: Nik Gonzales / Plan: MEDICARE A AND B / Product Type: Medicare A & B Fee for Service /   3 of 10 visits, PN due 01/12/2020

## 2020-02-17 ENCOUNTER — OFFICE VISIT (OUTPATIENT)
Dept: PHYSICAL THERAPY | Facility: REHABILITATION | Age: 80
End: 2020-02-17
Payer: MEDICARE

## 2020-02-17 DIAGNOSIS — R26.9 GAIT DISORDER: ICD-10-CM

## 2020-02-17 DIAGNOSIS — I63.9 THALAMIC STROKE (HCC): Primary | ICD-10-CM

## 2020-02-17 PROCEDURE — 97112 NEUROMUSCULAR REEDUCATION: CPT | Performed by: PHYSICAL THERAPIST

## 2020-02-17 PROCEDURE — 97110 THERAPEUTIC EXERCISES: CPT | Performed by: PHYSICAL THERAPIST

## 2020-02-17 NOTE — PROGRESS NOTES
Daily Note     Today's date: 2020  Patient name: Hira Rooney  : 1940  MRN: [id number]  Referring provider: Sarah Morataya MD  Dx:   Encounter Diagnosis     ICD-10-CM    1  Thalamic stroke (HCC) I63 9    2  Gait disorder R26 9        Start Time: 945  Stop Time: 1030  Total time in clinic (min): 45 minutes    Subjective: Pt reports today is an "in between day" today  She felt lousy all day  because of a big breakfast so didn't do much exercise  Objective: See treatment diary below      Exercise Diary 2/6 2/10 2/17   Endurance Scifit lv 1 5 minutes x2  SciFit lvl 1 x 10 min       Lower Extremity Strengthening Wall 1/4 squats 10x3    Wall heel raises x30    Sit to stand from 19" surface 10x3    Ascending/descending clinic stairwell x2 Wall 1/4 squats 10x3 w/ kickball on back    Wall heel raises x30    Sit to stand from 17" surface 3x5    Ascending/descending clinic stairwell x2    Lateral stepping w/ pink Tband 15ft x4 Sit to stand from 17" surface 1 x 7, 2 x 5    Step ups x 10 without riser x 10 with one riser   Static Balance  Low kishor step over floor toe taps x15 each  Cone taps x15 each  Foam cone toe taps x15 each      Dynamic Balance Ambulation HTs and HNs 25ft x8    High knees w/ opposite hand touch 25ft x4    Cone toe taps x15  Cone double toe taps x15 Foam balance beam x4 Step over 6 low hurdles fwd and lateral x 4 min ea (CGA/Min A)    Lateral walking across foam beam then fwd walking up wedge x 4 min    Lateral walking across foam beam then lateral walk up wedge x 4 min    Walking in clinic with HT and HN x 100 ft x 4           Ice to end on bilateral legs x 10 min       Assessment: Tolerated treatment well  Patient continues to demonstrate fatigue with LE strengthening exercises requiring frequent rest breaks  She was able to perform walking up and down wedge without LOB  She continues to demonstrate difficulty with single limb stance exercises   While performing reciprocal step over hurdles she required CGA/min A for slight LOB  Patient requested ice to end session for bilateral lower extremities  Patient will continue to benefit from physical therapy to increase her aerobic endurance, static/dynamic balance, and strength  Treatment performed by Estle Goltz, SPT with direct supervision by Anabela Parra, PT DPT  Plan: Continue per plan of care  Continue to progress LE strengthening to tolerance, continue single limb balance challenges      INTERVENTION COMMENTS:  Diagnosis: Thalamic Stroke   Precautions: HTN, Hypothyroidism, fall risk  FOTO: 40, with 40% limitation in Hand function   Insurance: Payor: Ari Matute / Plan: MEDICARE A AND B / Product Type: Medicare A & B Fee for Service /   3 of 10 visits, PN due 01/12/2020

## 2020-02-20 ENCOUNTER — OFFICE VISIT (OUTPATIENT)
Dept: PHYSICAL THERAPY | Facility: REHABILITATION | Age: 80
End: 2020-02-20
Payer: MEDICARE

## 2020-02-20 DIAGNOSIS — I63.9 THALAMIC STROKE (HCC): Primary | ICD-10-CM

## 2020-02-20 DIAGNOSIS — R26.9 GAIT DISORDER: ICD-10-CM

## 2020-02-20 PROCEDURE — 97112 NEUROMUSCULAR REEDUCATION: CPT | Performed by: PHYSICAL THERAPIST

## 2020-02-20 PROCEDURE — 97110 THERAPEUTIC EXERCISES: CPT | Performed by: PHYSICAL THERAPIST

## 2020-02-20 NOTE — PROGRESS NOTES
Daily Note     Today's date: 2020  Patient name: Brad Lancaster  : 1940  MRN: [id number]  Referring provider: Elisabeth Weber MD  Dx:   Encounter Diagnosis     ICD-10-CM    1  Thalamic stroke (HCC) I63 9    2  Gait disorder R26 9                   Subjective: Pt reports feeling "eh" today  She was tired after last visit and right hip continues to giver her pain  She is not performing exercises at home states "they told me not to do them anymore because of my back " Feeling a little off today, she doesn't remember if she took her BP medication this morning  She reports she doesn't take her medication the same time everyday because she wakes up different times  Last visit to MD she reports her blood work came back good and she doesn't know why she continues to be tired      Objective: See treatment diary below      Exercise Diary 2/10 2/17 2/20   Endurance  SciFit lvl 1 x 10 min    Treadmill, 0 8-0 9 mph, 1% incline x 5 minutes    Lower Extremity Strengthening Wall 1/4 squats 10x3 w/ kickball on back    Wall heel raises x30    Sit to stand from 17" surface 3x5    Ascending/descending clinic stairwell x2    Lateral stepping w/ pink Tband 15ft x4 Sit to stand from 17" surface 1 x 7, 2 x 5    Step ups x 10 without riser x 10 with one riser Supine SLR flexion 2 x 10    Supine clamshells with orange TB 2 x 10       Vitals after table exercises: 185/88 mmHg, 66 bpm    Step ups fwd/lat on step with 1 riser 2 x 10     Sit to stand 19" surface 5 x 2 sets then from 17" chair x 5   Static Balance Low kishor step over floor toe taps x15 each  Cone taps x15 each  Foam cone toe taps x15 each       Dynamic Balance Foam balance beam x4 Step over 6 low hurdles fwd and lateral x 4 min ea (CGA/Min A)    Lateral walking across foam beam then fwd walking up wedge x 4 min    Lateral walking across foam beam then lateral walk up wedge x 4 min    Walking in clinic with HT and HN x 100 ft x 4 Step over 6 low hurdles fwd and lateral x 4 min ea (CGA/Min A)    Lateral walking across foam beam x 5 min          Ice to end on bilateral legs x 10 min     Ice to end on BLE x 10 min       Assessment: Tolerated treatment well  Patient was able to walk on treadmill with reports on right hip pain at end  She continues to demonstrate fatigue with LE strengthening exercises  She improved her performance with kishor step over laterally today, but continues to have minor LOB with forward reciprocal step over kishor requiring CGA  Patient requested ice to end session for bilateral lower extremities  Patient will continue to benefit from physical therapy to increase her aerobic endurance, static/dynamic balance, and strength  Treatment performed by KARY Valentin with direct supervision by Marysol Rodas PT DPT  Plan: Continue per plan of care  Continue to progress LE strengthening to tolerance, continue single limb balance challenges      INTERVENTION COMMENTS:  Diagnosis: Thalamic Stroke   Precautions: HTN, Hypothyroidism, fall risk  FOTO: 40, with 40% limitation in Hand function   Insurance: Payor: Jason Anna / Plan: MEDICARE A AND B / Product Type: Medicare A & B Fee for Service /   3 of 10 visits, PN due 01/12/2020

## 2020-02-24 ENCOUNTER — OFFICE VISIT (OUTPATIENT)
Dept: PHYSICAL THERAPY | Facility: REHABILITATION | Age: 80
End: 2020-02-24
Payer: MEDICARE

## 2020-02-24 DIAGNOSIS — I63.9 THALAMIC STROKE (HCC): Primary | ICD-10-CM

## 2020-02-24 DIAGNOSIS — R26.9 GAIT DISORDER: ICD-10-CM

## 2020-02-24 PROCEDURE — 97112 NEUROMUSCULAR REEDUCATION: CPT

## 2020-02-24 PROCEDURE — 97110 THERAPEUTIC EXERCISES: CPT

## 2020-02-24 NOTE — PROGRESS NOTES
Daily Note     Today's date: 2020  Patient name: Bunny Louis  : 1940  MRN: [id number]  Referring provider: Lawanda Cervantes MD  Dx:   Encounter Diagnosis     ICD-10-CM    1  Thalamic stroke (HCC) I63 9    2  Gait disorder R26 9        Start Time: 1031  Stop Time: 1114  Total time in clinic (min): 43 minutes    Subjective: Pt reports feeling good today but she is not doing her exercises as she says they get in the way of her work  She reports some exercises hurt her right groin, but she does not remember the specific exercises that bothered her       Objective: See treatment diary below    Pre: 171/77, 69 bpm  Post:      Exercise Diary 2/10 2/17 2/20 2/24   Endurance  SciFit lvl 1 x 10 min    Treadmill, 0 8-0 9 mph, 1% incline x 5 minutes     Lower Extremity Strengthening Wall 1/4 squats 10x3 w/ kickball on back    Wall heel raises x30    Sit to stand from 17" surface 3x5    Ascending/descending clinic stairwell x2    Lateral stepping w/ pink Tband 15ft x4 Sit to stand from 17" surface 1 x 7, 2 x 5    Step ups x 10 without riser x 10 with one riser Supine SLR flexion 2 x 10    Supine clamshells with orange TB 2 x 10       Vitals after table exercises: 185/88 mmHg, 66 bpm    Step ups fwd/lat on step with 1 riser 2 x 10     Sit to stand 19" surface 5 x 2 sets then from 17" chair x 5 Supine SLR flexion x10 (right groin pain)    Supine clamshells with pinkTB 2 x 10      Standing SLR 10x2 each  Standing SLR in abduction 10x2 each  Standing SLR into extension 10x2 each    Sit to stand 19" surface 10x2 sets then from 17" chair x 10   Static Balance Low kishor step over floor toe taps x15 each  Cone taps x15 each  Foam cone toe taps x15 each     Ap sway x1 minutes, 2 LOB  Backwards walking w/ EC 20ftx6  Foam FTEC x2 minutes 2 posterior LOB   Dynamic Balance Foam balance beam x4 Step over 6 low hurdles fwd and lateral x 4 min ea (CGA/Min A)    Lateral walking across foam beam then fwd walking up wedge x 4 min    Lateral walking across foam beam then lateral walk up wedge x 4 min    Walking in clinic with HT and HN x 100 ft x 4 Step over 6 low hurdles fwd and lateral x 4 min ea (CGA/Min A)    Lateral walking across foam beam x 5 min           Ice to end on bilateral legs x 10 min     Ice to end on BLE x 10 min        Assessment: Tolerated treatment well  Deferred supine SLR as pt reports right groin pain w/ this, however w/ standing SLR pt denies pain but reports high level of fatigue  Pt primarily experiences posterior LOB with balance exercises, instability increases w/ eyes closed  Educated to pt static corner balance exercises to address this deficit, pt demonstrated and verbalized understanding to complete correctly and safely  Cold pack provided to pt at end of session for reported muscle soreness  Pt Patient will continue to benefit from physical therapy to increase her aerobic endurance, static/dynamic balance, and strength  Plan: Continue per plan of care  Continue to progress LE strengthening to tolerance, continue single limb balance challenges  Practice backwards walking w/ EC and EO      INTERVENTION COMMENTS:  Diagnosis: Thalamic Stroke   Precautions: HTN, Hypothyroidism, fall risk  FOTO: 40, with 40% limitation in Hand function   Insurance: Payor: Sophia Velasco / Plan: MEDICARE A AND B / Product Type: Medicare A & B Fee for Service /   3 of 10 visits, PN due 01/12/2020

## 2020-02-27 ENCOUNTER — EVALUATION (OUTPATIENT)
Dept: PHYSICAL THERAPY | Facility: REHABILITATION | Age: 80
End: 2020-02-27
Payer: MEDICARE

## 2020-02-27 DIAGNOSIS — R26.9 GAIT DISORDER: ICD-10-CM

## 2020-02-27 DIAGNOSIS — I63.9 THALAMIC STROKE (HCC): Primary | ICD-10-CM

## 2020-02-27 PROCEDURE — 97112 NEUROMUSCULAR REEDUCATION: CPT | Performed by: PHYSICAL THERAPIST

## 2020-02-27 NOTE — LETTER
2020    Pete Oviedo MD  [address]  [address] [facility]    Patient: Jacob Garcia   YOB: 1940   Date of Visit: 2020     Encounter Diagnosis     ICD-10-CM    1  Thalamic stroke (HCC) I63 9    2  Gait disorder R26 9        Dear Dr Imtiaz Harvey: Thank you for your recent referral of Jacob Garcia  Please review the attached evaluation summary from Ivory's recent visit  Please verify that you agree with the plan of care by signing the attached order  If you have any questions or concerns, please do not hesitate to call  I sincerely appreciate the opportunity to share in the care of one of your patients and hope to have another opportunity to work with you in the near future  Sincerely,    Jyothi Cortes, PT      Referring Provider:      I certify that I have read the below Plan of Care and certify the need for these services furnished under this plan of treatment while under my care  Pete Oviedo MD  [address] [place] [address]  VIA Facsimile: [facility] / Treatment Note     Today's date: 2020  Patient name: Jacob Garcia  : 1940  MRN: [id number]  Referring provider: Gris Marquez MD  Dx:   Encounter Diagnosis     ICD-10-CM    1  Thalamic stroke (HCC) I63 9    2  Gait disorder R26 9      Subjective:    Pamela Vazquez feels she's improved a lot with her mobility  Once in a while she feels off a bit  She wants to be able to get her get her curtains down, but must get on a stepstool and wont do this  She has some difficulty cleaning her bathroom, the tub is small, she must lift a chair out, some difficulty getting down to scrub the tub  Some difficulty carrying things up and down steps, is bumping things up and down on steps  She would like to be more active, is starting to be more active  Objective: See treatment diary below     /73, 63 bpm    Exercise Diary 2/20 2/24 2/27/20   Endurance Treadmill, 0 8-0 9 mph, 1% incline x 5 minutes      Lower Extremity Strengthening Supine SLR flexion 2 x 10    Supine clamshells with orange TB 2 x 10       Vitals after table exercises: 185/88 mmHg, 66 bpm    Step ups fwd/lat on step with 1 riser 2 x 10     Sit to stand 19" surface 5 x 2 sets then from 17" chair x 5 Supine SLR flexion x10 (right groin pain)    Supine clamshells with pinkTB 2 x 10      Standing SLR 10x2 each  Standing SLR in abduction 10x2 each  Standing SLR into extension 10x2 each    Sit to stand 19" surface 10x2 sets then from 17" chair x 10    Static Balance  Ap sway x1 minutes, 2 LOB  Backwards walking w/ EC 20ftx6  Foam FTEC x2 minutes 2 posterior LOB Posterior perturbation training, 4 min    Standing on foam, 2 min   Dynamic Balance Step over 6 low hurdles fwd and lateral x 4 min ea (CGA/Min A)    Lateral walking across foam beam x 5 min           Ice to end on BLE x 10 min         98%SpO2, 72 bpm  Pre session sit: /85 HR 71      Outcome Measures 12/9/2019 12/31/19 1/27/20 2/27/20   2 Minute Walk Test (ft): 350 feet 6 minute walk test, ending heart rate 72 bpm    550 feet, stopping at 4 minutes into test due to back pain and hip pain 6 MWT      800 feet in 5 min 15 seconds, stopped due to right hip and left calf discomfort HR 72 bpm 6 minute walk test    1050 feet, 93% SpO2, 83 bpm    No rest breaks   10MWT (s):  13 49 seconds 12 4 seconds with cane No SPC 1 1 m/second 0 89 meters/second (in 6 minute walk test)   5x Sit To Stand (s):  20 57 seconds 15 3 seconds 17 0 seconds 16 3 seconds, 17" surface, hands across chest   TUG (s) NT 12 3 seconds 9 44 seconds 9 3 seconds    11 2 seconds with cognitive task   Functional Gait Assessment  19/30 22/30 26/30     Functional Gait Assessment  3/3 Gait level surface  3/3 Change in gait speed  3/3 Gait with horizontal head turns  3/3 Gait with vertical head turns  3/3 Gait and pivot turn  3/3 Step over obstacle  1/3 Gait with narrow base of support  3/3 Gait with eyes closed  2/3 Ambulating backwards  2/3 Steps  26/30 Total score (less than 22/30 indicates increased risk of fall)       Mildly delayed stepping response to the left posterior direction  Otherwise good stepping responses  Anna Gil continues to demonstrate improvement in her mobility  She performed much better during her 6 minute walk test and the Functional Gait Assessment than previously  She improved but still could benefit from more functional lower extremity strengthening  We reviewed a home exercise program to continue to address these areas  STG's (8 weeks):   - Patient will be independent with home exercise program in 2 weeks  MET with continued focus on aerobic activity  - Patient will demonstrate a  1m/sec increase in gait speed time to demonstrate improved functional mobility and functional independence  MET   - Patient will complete 5 time sit to  no more than 15 seconds to demonstrate improved functional mobility, functional strength, and functional independence  MET   - Patient will complete 6 minute walk test to demonstrate improved functional mobility, functional independence, and functional endurance  MET    LTG's (16 weeks):  - Patient will demonstrate a  1m/sec increase in gait speed time to demonstrate improved functional mobility and functional independence  - MET  - Patient will complete 5 time sit to  no more than 12 seonds seconds to demonstrate improved functional mobility, functional strength, and functional independence  - PROGRESSED, NOT MET  - Patient will demonstrate at least a 10% improvement on the 6 minute walk test to improved functional mobility, functional independence, and functional endurance   - MET  - Patient will score at least 22/30 on FGA to demonstrate improved dynamic balance, improved functional mobility, functional strength, and functional independence - MET    PLAN OF CARE  Discharge from therapy with home exercise program     Addendum 2/28/20:  Kaylah Mathias had called the  and reported she had some back pain after exercise where she was pulled backwards and her back hit the parallel bars along with her arm  This therapist returned her call ([phone]) and she reported that her back had tightened up later that evening after therapy was closed, and still felt tight, she was asking whether she should put ice or heat on it  She reports she's able to be up and moving  Patient instructed in putting some heat on the area at this point and doing some gentle chair stretches (gentle forward flexion, gentle trunk rotation)  She can call physical therapy for assistance if this doesn't shamika

## 2020-02-27 NOTE — PROGRESS NOTES
RE-EVALUATION / Treatment Note     Today's date: 2020  Patient name: Ananya Kinney  : 1940  MRN: [id number]  Referring provider: Nadir Kidd MD  Dx:   Encounter Diagnosis     ICD-10-CM    1  Thalamic stroke (HCC) I63 9    2  Gait disorder R26 9      Subjective:    Patrica Whitlock feels she's improved a lot with her mobility  Once in a while she feels off a bit  She wants to be able to get her get her curtains down, but must get on a stepstool and wont do this  She has some difficulty cleaning her bathroom, the tub is small, she must lift a chair out, some difficulty getting down to scrub the tub  Some difficulty carrying things up and down steps, is bumping things up and down on steps  She would like to be more active, is starting to be more active  Objective: See treatment diary below     /73, 63 bpm    Exercise Diary 20   Endurance Treadmill, 0 8-0 9 mph, 1% incline x 5 minutes      Lower Extremity Strengthening Supine SLR flexion 2 x 10    Supine clamshells with orange TB 2 x 10       Vitals after table exercises: 185/88 mmHg, 66 bpm    Step ups fwd/lat on step with 1 riser 2 x 10     Sit to stand 19" surface 5 x 2 sets then from 17" chair x 5 Supine SLR flexion x10 (right groin pain)    Supine clamshells with pinkTB 2 x 10      Standing SLR 10x2 each  Standing SLR in abduction 10x2 each  Standing SLR into extension 10x2 each    Sit to stand 19" surface 10x2 sets then from 17" chair x 10    Static Balance  Ap sway x1 minutes, 2 LOB  Backwards walking w/ EC 20ftx6  Foam FTEC x2 minutes 2 posterior LOB Posterior perturbation training, 4 min    Standing on foam, 2 min   Dynamic Balance Step over 6 low hurdles fwd and lateral x 4 min ea (CGA/Min A)    Lateral walking across foam beam x 5 min           Ice to end on BLE x 10 min         98%SpO2, 72 bpm  Pre session sit: /85 HR 71      Outcome Measures 19   2 Minute Walk Test (ft): 350 feet 6 minute walk test, ending heart rate 72 bpm    550 feet, stopping at 4 minutes into test due to back pain and hip pain 6 MWT      800 feet in 5 min 15 seconds, stopped due to right hip and left calf discomfort HR 72 bpm 6 minute walk test    1050 feet, 93% SpO2, 83 bpm    No rest breaks   10MWT (s):  13 49 seconds 12 4 seconds with cane No SPC 1 1 m/second 0 89 meters/second (in 6 minute walk test)   5x Sit To Stand (s):  20 57 seconds 15 3 seconds 17 0 seconds 16 3 seconds, 17" surface, hands across chest   TUG (s) NT 12 3 seconds 9 44 seconds 9 3 seconds    11 2 seconds with cognitive task   Functional Gait Assessment  19/30 22/30 26/30     Functional Gait Assessment  3/3 Gait level surface  3/3 Change in gait speed  3/3 Gait with horizontal head turns  3/3 Gait with vertical head turns  3/3 Gait and pivot turn  3/3 Step over obstacle  1/3 Gait with narrow base of support  3/3 Gait with eyes closed  2/3 Ambulating backwards  2/3 Steps  26/30 Total score (less than 22/30 indicates increased risk of fall)       Mildly delayed stepping response to the left posterior direction  Otherwise good stepping responses  Marly Matthews continues to demonstrate improvement in her mobility  She performed much better during her 6 minute walk test and the Functional Gait Assessment than previously  She improved but still could benefit from more functional lower extremity strengthening  We reviewed a home exercise program to continue to address these areas  STG's (8 weeks):   - Patient will be independent with home exercise program in 2 weeks  MET with continued focus on aerobic activity  - Patient will demonstrate a  1m/sec increase in gait speed time to demonstrate improved functional mobility and functional independence  MET   - Patient will complete 5 time sit to  no more than 15 seconds to demonstrate improved functional mobility, functional strength, and functional independence    MET   - Patient will complete 6 minute walk test to demonstrate improved functional mobility, functional independence, and functional endurance  MET    LTG's (16 weeks):  - Patient will demonstrate a  1m/sec increase in gait speed time to demonstrate improved functional mobility and functional independence  - MET  - Patient will complete 5 time sit to  no more than 12 seonds seconds to demonstrate improved functional mobility, functional strength, and functional independence  - PROGRESSED, NOT MET  - Patient will demonstrate at least a 10% improvement on the 6 minute walk test to improved functional mobility, functional independence, and functional endurance  - MET  - Patient will score at least 22/30 on FGA to demonstrate improved dynamic balance, improved functional mobility, functional strength, and functional independence - MET    PLAN OF CARE  Discharge from therapy with home exercise program     Addendum 2/28/20:  Kaylah Stew had called the  and reported she had some back pain after exercise where she was pulled backwards and her back hit the parallel bars along with her arm  This therapist returned her call ([phone]) and she reported that her back had tightened up later that evening after therapy was closed, and still felt tight, she was asking whether she should put ice or heat on it  She reports she's able to be up and moving  Patient instructed in putting some heat on the area at this point and doing some gentle chair stretches (gentle forward flexion, gentle trunk rotation)  She can call physical therapy for assistance if this doesn't shamika

## 2020-03-05 ENCOUNTER — TRANSCRIBE ORDERS (OUTPATIENT)
Dept: PHYSICAL THERAPY | Facility: REHABILITATION | Age: 80
End: 2020-03-05

## 2020-03-05 DIAGNOSIS — I63.9 IMPENDING CEREBROVASCULAR ACCIDENT (HCC): Primary | ICD-10-CM
